# Patient Record
Sex: FEMALE | Race: WHITE | NOT HISPANIC OR LATINO | Employment: FULL TIME | URBAN - METROPOLITAN AREA
[De-identification: names, ages, dates, MRNs, and addresses within clinical notes are randomized per-mention and may not be internally consistent; named-entity substitution may affect disease eponyms.]

---

## 2022-04-14 ENCOUNTER — OFFICE VISIT (OUTPATIENT)
Dept: OBSTETRICS AND GYNECOLOGY | Facility: CLINIC | Age: 27
End: 2022-04-14

## 2022-04-14 VITALS — HEART RATE: 79 BPM | DIASTOLIC BLOOD PRESSURE: 80 MMHG | SYSTOLIC BLOOD PRESSURE: 125 MMHG | WEIGHT: 189 LBS

## 2022-04-14 DIAGNOSIS — Z30.432 ENCOUNTER FOR IUD REMOVAL: ICD-10-CM

## 2022-04-14 DIAGNOSIS — Z01.419 WELL WOMAN EXAM: Primary | ICD-10-CM

## 2022-04-14 PROCEDURE — 99385 PREV VISIT NEW AGE 18-39: CPT | Performed by: NURSE PRACTITIONER

## 2022-04-14 PROCEDURE — 58301 REMOVE INTRAUTERINE DEVICE: CPT | Performed by: NURSE PRACTITIONER

## 2022-04-14 PROCEDURE — 87491 CHLMYD TRACH DNA AMP PROBE: CPT | Performed by: NURSE PRACTITIONER

## 2022-04-14 PROCEDURE — G0123 SCREEN CERV/VAG THIN LAYER: HCPCS | Performed by: NURSE PRACTITIONER

## 2022-04-14 PROCEDURE — 87591 N.GONORRHOEAE DNA AMP PROB: CPT | Performed by: NURSE PRACTITIONER

## 2022-04-14 RX ORDER — NORETHINDRONE ACETATE AND ETHINYL ESTRADIOL AND FERROUS FUMARATE 1MG-20(24)
1 KIT ORAL DAILY
Qty: 28 TABLET | Refills: 12 | Status: SHIPPED | OUTPATIENT
Start: 2022-04-14 | End: 2023-04-14

## 2022-04-14 NOTE — PROGRESS NOTES
HPI:   26 y.o..Presents for well woman exam. Contraception or HRT: Contraception:  Mirena IUD  Menses: No menses with IUD  Pain:  None  Last pap normal 2017  Complaints: Desires IUD removal. . I reviewed the procedure in detail.  She understand the potential risks include, but are not limited to, pain, bleeding, and infection.  Her questions have been answered. Consent has been reviewed and signed.  Patient reports that she is not currently experiencing any symptoms of urinary incontinence.      History reviewed. No pertinent past medical history.   Past Surgical History:   Procedure Laterality Date   •  SECTION        Family History   Problem Relation Age of Onset   • Heart disease Father         PCP: does manage PMHx and preventative labs    PHYSICAL EXAM: Chaperone present /80   Pulse 79   Wt 85.7 kg (189 lb)   LMP  (LMP Unknown)   Breastfeeding No   General- NAD, alert and oriented, appropriate  Psych- Normal mood, good memory  Neck- No masses, no thyroid enlargement  Lymphatic- No palpable neck, axillary, or groin nodes  CV- Regular rhythm, no murmurs  Resp- CTA to bases, no wheezes  Abdomen- Soft, non distended, non tender, no masses  Breast left-  Bilaterally symmetrical, no masses, non tender, no nipple discharge  Breast right- Bilaterally symmetrical, no masses, non tender, no nipple discharge  External genitalia- Normal female, no lesions  Urethra/meatus- Normal, no masses, non tender, no prolapse  Bladder- Normal, no masses, non tender, no prolapse  Vagina- Normal, no atrophy, no lesions, no discharge, no prolapse  Cvx- Normal, no lesions, no discharge, No cervical motion tenderness,  Strings visualized at 1 cm, IUD removed intact.  Discarded. Patient tolerated the procedure well.  Patient tolerated the procedure well  Uterus- Normal size, shape & consistency.  Non tender, mobile, & no prolapse  Adnexa- No mass, non tender  Anus/Rectum/Perineum- Not performed  Ext- No edema, no  cyanosis    Skin- No lesions, no rashes, no acanthosis nigricans       ASSESSMENT and PLAN:    Diagnoses and all orders for this visit:    1. Well woman exam (Primary)  -     IGP, CtNg, Rfx Aptima HPV ASCU    2. Encounter for IUD removal  -     IGP, CtNg, Rfx Aptima HPV ASCU    Other orders  -     norethindrone-ethinyl estradiol-ferrous fumarate (LOESTIN 24 FE) 1-20 MG-MCG(24) per tablet; Take 1 tablet by mouth Daily.  Dispense: 28 tablet; Refill: 12      Preventative:   BREAST HEALTH- Monthly self breast exam importance and how to reviewed. MMG and/or MRI (prn) reviewed per society guidelines and her individual history. Screen: Updated today  SEXUAL HEALTH: STD screening recommended.  Ordered,  Safe sex and condoms  Follow up PCP/Specialist PMHx and Labs  Myriad: Does not qualify.  All BIRTH CONTROL options R/B/A/SE/E of each reviewed in detail.  OCP/hormone use risk THROMBOEMBOLIC RISK reviewed.     SAFE SEX/condoms importance reviewed.    She understand she can become pregnant immediately.  I recommend she keep track of menses and RTO if <q21d, >7d long, heavy or painful.  R/B/A/SE/efficacy of all BC options reviewed with respect to her individual medical history.   She has decided on OCP (estrogen/progesterone) for BC.  If she desires pregnancy I have encouraged a healthy lifestyle and PNV.     PRECAUTIONS - She was advised to watch for fever, chills, vaginal discharge or odor.    She understands the importance of having any ordered tests to be performed in a timely fashion.  The risks of not performing them include, but are not limited to, advanced cancer stages, bone loss from osteoporosis and/or subsequent increase in morbidity and/or mortality.  She is encouraged to review her results online and/or contact or office if she has questions.     Follow Up:  No follow-ups on file.        Dee Wakefield, APRN  04/14/2022

## 2022-04-21 LAB
C TRACH RRNA CVX QL NAA+PROBE: NEGATIVE
CONV .: NORMAL
CYTOLOGIST CVX/VAG CYTO: NORMAL
CYTOLOGY CVX/VAG DOC CYTO: NORMAL
CYTOLOGY CVX/VAG DOC THIN PREP: NORMAL
DX ICD CODE: NORMAL
HIV 1 & 2 AB SER-IMP: NORMAL
N GONORRHOEA RRNA CVX QL NAA+PROBE: NEGATIVE
OTHER STN SPEC: NORMAL
STAT OF ADQ CVX/VAG CYTO-IMP: NORMAL

## 2023-05-11 NOTE — PROGRESS NOTES
"  HPI:   27 y.o. . Presents for well woman exam. Contraception:  Natural family planning  Menses:   q month days, lasts 5 days, changes regular tampon q 2hrs on heaviest days. 6 weeks late on menses, home pregnancy test negative  Desires to conceive  Pain:  None  Last pap: normal   Complaints: Actively attempting conception for over 1 year, Mirena removed -     having pain with ejaculation, seeing a urologist upcoming    10 pound weight gain over past month, eating out more  No acne, has shaved upper lip since teen    IGP, CtNg, Rfx Aptima HPV ASCU (2022 14:14)       History reviewed. No pertinent past medical history.   Past Surgical History:   Procedure Laterality Date   • ANKLE SURGERY Left     x3   •  SECTION     • TONGUE SURGERY      resconstruction   • WISDOM TOOTH EXTRACTION        Family History   Problem Relation Age of Onset   • Heart disease Father    • Breast cancer Neg Hx    • Ovarian cancer Neg Hx    • Uterine cancer Neg Hx    • Prostate cancer Neg Hx    • Colon cancer Neg Hx      Allergies as of 05/15/2023 - Reviewed 05/15/2023   Allergen Reaction Noted   • Sulfa antibiotics Rash 10/12/2021        PCP: does manage PMHx and preventative labs    /84   Pulse 95   Ht 160 cm (63\")   Wt 102 kg (223 lb 12.8 oz)   LMP 2023 (Approximate)   BMI 39.64 kg/m²     PHYSICAL EXAM: Chaperone present   General- NAD, alert and oriented, appropriate  Psych- Normal mood, good memory  Neck- No masses, no thyroid enlargement  Lymphatic- No palpable neck, axillary, or groin nodes  CV- Regular rhythm, no murmurs  Resp- CTA to bases, no wheezes  Abdomen- Soft, non distended, non tender, no masses  Breast left-  Bilaterally symmetrical, no masses, non tender, no nipple discharge  Breast right- Bilaterally symmetrical, no masses, non tender, no nipple discharge  External genitalia- Normal female, no lesions  Urethra/meatus- Normal, no masses, non tender, no prolapse  Bladder- " Normal, no masses, non tender, no prolapse  Vagina- Normal, no atrophy, no lesions, no discharge, no prolapse  Cvx- Normal, no lesions, no discharge, No cervical motion tenderness  Uterus- Normal size, shape & consistency.  Non tender, mobile, & no prolapse  Adnexa- No mass, non tender  Anus/Rectum/Perineum- Not performed  Ext- No edema, no cyanosis    Skin- No lesions, no rashes, no acanthosis nigricans        ASSESSMENT and PLAN:    Diagnoses and all orders for this visit:    1. Well woman exam (Primary)    2. Missed period  -     POC Pregnancy, Urine  -     Prolactin; Future  -     TSH; Future  -     CBC (No Diff); Future  -     DHEA-Sulfate; Future  -     Insulin, Total; Future  -     Glucose, Fasting; Future  -     Testosterone; Future  -     17-Hydroxyprogesterone; Future      HCG, Urine, QL   Date Value Ref Range Status   05/15/2023 Negative Negative Final      Preventative:   BREAST HEALTH- Breast awareness, self breast exam, MMG and/or MRI reviewed per society guidelines and her individual history. Screen: Updated today  CERVICAL CANCER Screening- Reviewed current ASCCP guidelines for screening w and wo cotest HPV, age specific.  Screen: Updated today  COLON CANCER Screening- Reviewed current medical society guidelines and options.  Screen:  Not medically needed  SEXUAL HEALTH: Declines STD screening  BONE HEALTH- Reviewed current medical society guidelines and options for testing, calcium and vit D intake.  Weight bearing exercise.  DEXA: Not medically needed  VACCINATIONS Recommended: COVID and booster PRN, Flu annually.  Importance discussed, risk being unvaccinated reviewed.  Questions answered  Smoking status- NON SMOKER  SMOKING STATUS- pt does use nicotine and/or tobacco.  I reviewed importance of avoiding.  Recommend FU w PCP regarding quitting options if unable to quit wo assistance.  Does not qualify (age 50-80, 20pack yr hx, current or former smoker, quit w/in last 15yrs, no symptoms of lung CA)  for low dose CT of chest.  If qualifies, I recommend pt FU w PCP to schedule/monitor screening for lung CA  Follow up PCP/Specialist PMHx and Labs  Genetic testing: Does not qualify.  PCOS- Dx, Tx, weight, pregnancy, periods/anovulation, cholesterol, insulin, and uterine cancer risk discussed w pt.  Pregnancy risks associated with Obesity: antepartum, intrapartum, postpartum complications to include: increased risk   birth, impaired growth, spontaneous , stillbirth, Neural tube defects, congenital anomalies, maternal-cardiac dysfunction, gestational diabetes, preeclampsia. Postpartum: wound dehiscence, venous thrombosis, maternal and fetal morbidity.    Healthy diet and PNV daily if desire pregnancy   having workup d/t issues with pain with ejaculation, previous pelvic injury, if desires fertility counseling, follow up with MD discuss treatment     She understands the importance of having any ordered tests to be performed in a timely fashion.  The risks of not performing them include, but are not limited to, advanced cancer stages, bone loss from osteoporosis and/or subsequent increase in morbidity and/or mortality.  She is encouraged to review her results online and/or contact or office if she has questions.     Follow Up:  Return for will call with lab results.        Dee Wakefield, APRN  05/15/2023

## 2023-05-15 ENCOUNTER — OFFICE VISIT (OUTPATIENT)
Dept: OBSTETRICS AND GYNECOLOGY | Facility: CLINIC | Age: 28
End: 2023-05-15
Payer: COMMERCIAL

## 2023-05-15 VITALS
HEIGHT: 63 IN | HEART RATE: 95 BPM | BODY MASS INDEX: 39.65 KG/M2 | DIASTOLIC BLOOD PRESSURE: 84 MMHG | WEIGHT: 223.8 LBS | SYSTOLIC BLOOD PRESSURE: 137 MMHG

## 2023-05-15 DIAGNOSIS — Z01.419 WELL WOMAN EXAM: Primary | ICD-10-CM

## 2023-05-15 DIAGNOSIS — N92.6 MISSED PERIOD: ICD-10-CM

## 2023-05-15 LAB
B-HCG UR QL: NEGATIVE
EXPIRATION DATE: NORMAL
INTERNAL NEGATIVE CONTROL: NORMAL
INTERNAL POSITIVE CONTROL: NORMAL
Lab: NORMAL

## 2023-05-18 ENCOUNTER — LAB (OUTPATIENT)
Dept: OBSTETRICS AND GYNECOLOGY | Facility: CLINIC | Age: 28
End: 2023-05-18
Payer: COMMERCIAL

## 2023-05-18 DIAGNOSIS — N92.6 MISSED PERIOD: ICD-10-CM

## 2023-05-18 LAB
DEPRECATED RDW RBC AUTO: 40.2 FL (ref 37–54)
ERYTHROCYTE [DISTWIDTH] IN BLOOD BY AUTOMATED COUNT: 13.2 % (ref 12.3–15.4)
GLUCOSE P FAST SERPL-MCNC: 103 MG/DL (ref 74–106)
HCT VFR BLD AUTO: 40.5 % (ref 34–46.6)
HGB BLD-MCNC: 14.1 G/DL (ref 12–15.9)
MCH RBC QN AUTO: 29.1 PG (ref 26.6–33)
MCHC RBC AUTO-ENTMCNC: 34.8 G/DL (ref 31.5–35.7)
MCV RBC AUTO: 83.7 FL (ref 79–97)
PLATELET # BLD AUTO: 267 10*3/MM3 (ref 140–450)
PMV BLD AUTO: 9.7 FL (ref 6–12)
PROLACTIN SERPL-MCNC: 20.9 NG/ML (ref 4.79–23.3)
RBC # BLD AUTO: 4.84 10*6/MM3 (ref 3.77–5.28)
TESTOST SERPL-MCNC: 43.3 NG/DL (ref 8.4–48.1)
TSH SERPL DL<=0.05 MIU/L-ACNC: 1.99 UIU/ML (ref 0.27–4.2)
WBC NRBC COR # BLD: 6.16 10*3/MM3 (ref 3.4–10.8)

## 2023-05-18 PROCEDURE — 84403 ASSAY OF TOTAL TESTOSTERONE: CPT | Performed by: NURSE PRACTITIONER

## 2023-05-18 PROCEDURE — 85027 COMPLETE CBC AUTOMATED: CPT | Performed by: NURSE PRACTITIONER

## 2023-05-18 PROCEDURE — 83525 ASSAY OF INSULIN: CPT | Performed by: NURSE PRACTITIONER

## 2023-05-18 PROCEDURE — 84146 ASSAY OF PROLACTIN: CPT | Performed by: NURSE PRACTITIONER

## 2023-05-18 PROCEDURE — 84443 ASSAY THYROID STIM HORMONE: CPT | Performed by: NURSE PRACTITIONER

## 2023-05-18 PROCEDURE — 82947 ASSAY GLUCOSE BLOOD QUANT: CPT | Performed by: NURSE PRACTITIONER

## 2023-05-18 PROCEDURE — 83498 ASY HYDROXYPROGESTERONE 17-D: CPT | Performed by: NURSE PRACTITIONER

## 2023-05-18 PROCEDURE — 82627 DEHYDROEPIANDROSTERONE: CPT | Performed by: NURSE PRACTITIONER

## 2023-05-20 LAB
DHEA-S SERPL-MCNC: 195 UG/DL (ref 84.8–378)
INSULIN SERPL-ACNC: 44 UIU/ML (ref 2.6–24.9)

## 2023-05-22 ENCOUNTER — TELEPHONE (OUTPATIENT)
Dept: OBSTETRICS AND GYNECOLOGY | Facility: CLINIC | Age: 28
End: 2023-05-22
Payer: COMMERCIAL

## 2023-05-22 DIAGNOSIS — E16.1 HYPERINSULINEMIA: Primary | ICD-10-CM

## 2023-05-22 NOTE — TELEPHONE ENCOUNTER
----- Message from JOSE Patel sent at 5/22/2023  8:14 AM EDT -----  Inform [place] her fasting blood sugar was at the high end of the normal range, additionally her insulin level was slightly elevated. I am sending metformin to her pharmacy to help improve insulin sensitivity, additionally recommend working on increasing exercise and limit sugar/carb intake in her diet, weightloss will also help with regulating menstruation and ovulation and lower risk of pregnancy complications as she desires conception. Common side effect of GI upset/diarrhea first 2 weeks of use, will start with low dose to minimize this side effect. Follow up in 3 months.

## 2023-05-22 NOTE — TELEPHONE ENCOUNTER
Discussed results and recommendations with pt, advised a prescription for metformin was sent to her pharmacy. Went over side effects for the first 2 weeks. Pt stated she did have a chocolate pop tart and a half of a mountain dew at about abut 12:30 PM the night before . She wanted to know if this could have affected the test.

## 2023-05-22 NOTE — TELEPHONE ENCOUNTER
Discussed with pt that it would not have had anything to do with her results that she ate at 1230.

## 2023-05-24 LAB — 17OHP SERPL-MCNC: 70 NG/DL

## 2023-08-15 DIAGNOSIS — M25.472 LEFT ANKLE SWELLING: ICD-10-CM

## 2023-08-15 RX ORDER — FUROSEMIDE 20 MG/1
TABLET ORAL
Qty: 30 TABLET | Refills: 1 | Status: SHIPPED | OUTPATIENT
Start: 2023-08-15

## 2023-08-21 NOTE — PROGRESS NOTES
"GYN Problem/Follow Up Visit    Chief Complaint   Patient presents with    Follow-up     Meds            HPI  Mali Mercer is a 28 y.o. female, , who presents for follow up after starting metformin for management insulin resistance, elevated fasting insulin levels  44.0.  Reports tolerated metformin 500mg once daily, when taking twice daily experiences loose stool. Was started on Phentermine by PCP to aid in weightloss. Has lost 15 pounds. Has fu with PCP tomorrow.     Desires pregnancy.  had circumcision recently to relieve obstruction.       Additional OB/GYN History   Patient's last menstrual period was 2023 (exact date).  Current contraception: contraceptive methods: None    History reviewed. No pertinent past medical history.   Past Surgical History:   Procedure Laterality Date    ANKLE SURGERY Left     x3     SECTION      FRACTURE SURGERY  2011    TONGUE SURGERY      resconstruction    WISDOM TOOTH EXTRACTION        Family History   Problem Relation Age of Onset    Heart disease Father     Anxiety disorder Father     Breast cancer Neg Hx     Ovarian cancer Neg Hx     Uterine cancer Neg Hx     Prostate cancer Neg Hx     Colon cancer Neg Hx      Allergies as of 2023 - Reviewed 2023   Allergen Reaction Noted    Sulfa antibiotics Rash 10/12/2021      The additional following portions of the patient's history were reviewed and updated as appropriate: allergies, current medications, past family history, past medical history, past social history, past surgical history, and problem list.    Review of Systems    See HPI for pertinent ROS    Objective   /86   Pulse 98   Ht 160 cm (63\")   Wt 94.3 kg (208 lb)   LMP 2023 (Exact Date)   BMI 36.85 kg/mý     Physical Exam  Vitals and nursing note reviewed.   Constitutional:       Appearance: Normal appearance. She is well-developed and well-groomed.   Cardiovascular:      Rate and Rhythm: Normal rate. "   Pulmonary:      Effort: Pulmonary effort is normal.   Skin:     General: Skin is warm and dry.   Neurological:      Mental Status: She is alert and oriented to person, place, and time.   Psychiatric:         Mood and Affect: Affect normal.         Cognition and Memory: Cognition normal.          Assessment and Plan    Diagnoses and all orders for this visit:    1. Hyperinsulinemia (Primary)  -     metFORMIN (GLUCOPHAGE) 500 MG tablet; Take 1 tablet by mouth 2 (Two) Times a Day With Meals. Start with once daily after dinner for the first week, increase to twice daily if tolerates  Dispense: 180 tablet; Refill: 3      Counseling:  TRACK MENSES, RTO if <q21d, >7d long, heavy or painful.    Recommend PNV daily and healthy lifestyle if desires pregnancy, recommend contraceptive/barrier method while taking phentermine d/t risk of fetal harm. PCP to collect lab work at tomorrows office visit. Importance of starting pregnancy at healthy baseline weight. Refill metformin, recommend continuity of care/surveillance of insulin resistance with PCP.      Follow Up:  Return if symptoms worsen or fail to improve.        Dee Wakefield, APRN  08/22/2023

## 2023-08-22 ENCOUNTER — OFFICE VISIT (OUTPATIENT)
Dept: OBSTETRICS AND GYNECOLOGY | Facility: CLINIC | Age: 28
End: 2023-08-22
Payer: COMMERCIAL

## 2023-08-22 VITALS
HEIGHT: 63 IN | WEIGHT: 208 LBS | BODY MASS INDEX: 36.86 KG/M2 | DIASTOLIC BLOOD PRESSURE: 86 MMHG | HEART RATE: 98 BPM | SYSTOLIC BLOOD PRESSURE: 138 MMHG

## 2023-08-22 DIAGNOSIS — E16.1 HYPERINSULINEMIA: Primary | ICD-10-CM

## 2023-08-23 ENCOUNTER — OFFICE VISIT (OUTPATIENT)
Dept: FAMILY MEDICINE CLINIC | Facility: CLINIC | Age: 28
End: 2023-08-23
Payer: COMMERCIAL

## 2023-08-23 ENCOUNTER — HOSPITAL ENCOUNTER (OUTPATIENT)
Dept: GENERAL RADIOLOGY | Facility: HOSPITAL | Age: 28
Discharge: HOME | End: 2023-08-23
Admitting: STUDENT IN AN ORGANIZED HEALTH CARE EDUCATION/TRAINING PROGRAM
Payer: COMMERCIAL

## 2023-08-23 VITALS
HEART RATE: 96 BPM | BODY MASS INDEX: 36.86 KG/M2 | HEIGHT: 63 IN | WEIGHT: 208 LBS | RESPIRATION RATE: 16 BRPM | DIASTOLIC BLOOD PRESSURE: 72 MMHG | OXYGEN SATURATION: 96 % | TEMPERATURE: 97.5 F | SYSTOLIC BLOOD PRESSURE: 116 MMHG

## 2023-08-23 DIAGNOSIS — E66.9 OBESITY (BMI 30-39.9): ICD-10-CM

## 2023-08-23 DIAGNOSIS — Z00.00 ANNUAL PHYSICAL EXAM: Primary | ICD-10-CM

## 2023-08-23 DIAGNOSIS — M79.10 MUSCLE PAIN: ICD-10-CM

## 2023-08-23 DIAGNOSIS — R10.9 FLANK PAIN: ICD-10-CM

## 2023-08-23 PROCEDURE — 74018 RADEX ABDOMEN 1 VIEW: CPT

## 2023-08-23 PROCEDURE — 99395 PREV VISIT EST AGE 18-39: CPT | Performed by: STUDENT IN AN ORGANIZED HEALTH CARE EDUCATION/TRAINING PROGRAM

## 2023-08-23 RX ORDER — PHENTERMINE HYDROCHLORIDE 37.5 MG/1
37.5 CAPSULE ORAL EVERY MORNING
Qty: 60 CAPSULE | Refills: 0 | Status: SHIPPED | OUTPATIENT
Start: 2023-08-23

## 2023-08-23 RX ORDER — CYCLOBENZAPRINE HCL 10 MG
10 TABLET ORAL 2 TIMES DAILY PRN
Qty: 30 TABLET | Refills: 1 | Status: SHIPPED | OUTPATIENT
Start: 2023-08-23

## 2023-08-23 RX ORDER — PREDNISONE 20 MG/1
20 TABLET ORAL DAILY
Qty: 5 TABLET | Refills: 0 | Status: SHIPPED | OUTPATIENT
Start: 2023-08-23

## 2023-08-23 NOTE — PROGRESS NOTES
"Chief Complaint  Patient is here for annual physical and follow-up on obesity/medications/right flank pain        Subjective         Mali Mercer is a 28 y.o. female who presents to [facility]    28 years old comes to the clinic today for annual physical and follow-up.    Patient was started on phentermine, lost 12 pounds in the last 2 months.  Reports no side effects of the medication, patient would like to continue for 2 more months    Patient is complaining of right mid back pain which started during her travels out of state about few weeks ago, nonradiating, mild tenderness without any urinary symptoms/abdominal symptoms/nausea/vomiting/bowel movement changes or any urinary changes.  Improves after resting, gets worse with certain position    12+ review of systems are unremarkable otherwise    Objective   Vital Signs:   Vitals:    08/23/23 0811   BP: 116/72   BP Location: Left arm   Patient Position: Sitting   Cuff Size: Adult   Pulse: 96   Resp: 16   Temp: 97.5 øF (36.4 øC)   TempSrc: Temporal   SpO2: 96%   Weight: 94.3 kg (208 lb)   Height: 160 cm (63\")      Body mass index is 36.85 kg/mý.   Wt Readings from Last 3 Encounters:   08/23/23 94.3 kg (208 lb)   08/22/23 94.3 kg (208 lb)   07/17/23 97.3 kg (214 lb 9.6 oz)      BP Readings from Last 3 Encounters:   08/23/23 116/72   08/22/23 138/86   07/17/23 128/70        Patient Care Team:  Kellen Casanova MD as PCP - General (Family Medicine)     Physical Exam  Vitals reviewed.   Constitutional:       Appearance: Normal appearance. She is well-developed.   HENT:      Head: Normocephalic and atraumatic.      Right Ear: External ear normal.      Left Ear: External ear normal.      Mouth/Throat:      Pharynx: No oropharyngeal exudate.   Eyes:      Conjunctiva/sclera: Conjunctivae normal.      Pupils: Pupils are equal, round, and reactive to light.   Cardiovascular:      Rate and Rhythm: Normal rate and regular rhythm.      " Heart sounds: No murmur heard.    No friction rub. No gallop.   Pulmonary:      Effort: Pulmonary effort is normal.      Breath sounds: Normal breath sounds. No wheezing or rhonchi.   Abdominal:      General: Bowel sounds are normal. There is no distension.      Palpations: Abdomen is soft.      Tenderness: There is no abdominal tenderness. There is right CVA tenderness. There is no left CVA tenderness, guarding or rebound. Negative signs include Chauhan's sign, Rovsing's sign, McBurney's sign and psoas sign.      Comments: Some tenderness to right mid back noted   Skin:     General: Skin is warm and dry.   Neurological:      Mental Status: She is alert and oriented to person, place, and time.      Cranial Nerves: No cranial nerve deficit.   Psychiatric:         Mood and Affect: Mood and affect normal.         Behavior: Behavior normal.         Thought Content: Thought content normal.         Judgment: Judgment normal.                          Assessment and Plan   Diagnoses and all orders for this visit:    1. Annual physical exam, next visit (Primary)  Comments:  Daily exercise and healthy diet recommended  Orders:  -     TSH Rfx On Abnormal To Free T4; Future  -     CBC & Differential; Future  -     Comprehensive Metabolic Panel; Future  -     Hemoglobin A1c; Future  -     Lipid Panel; Future    2. Obesity (BMI 30-39.9)  Comments:  2 more months phentermine  Orders:  -     phentermine 37.5 MG capsule; Take 1 capsule by mouth Every Morning.  Dispense: 60 capsule; Refill: 0  -     TSH Rfx On Abnormal To Free T4; Future  -     CBC & Differential; Future  -     Comprehensive Metabolic Panel; Future  -     Hemoglobin A1c; Future  -     Lipid Panel; Future    3. Flank pain  Comments:  KUB, prednisone/NSAIDs and muscle relaxant recommended.  If not improved in next 2 weeks; patient to reach out back for further evaluation including CT/referral  Orders:  -     XR Abdomen KUB; Future  -     predniSONE (DELTASONE) 20 MG  tablet; Take 1 tablet by mouth Daily.  Dispense: 5 tablet; Refill: 0  -     cyclobenzaprine (FLEXERIL) 10 MG tablet; Take 1 tablet by mouth 2 (Two) Times a Day As Needed for Muscle Spasms.  Dispense: 30 tablet; Refill: 1  -     TSH Rfx On Abnormal To Free T4; Future  -     CBC & Differential; Future  -     Comprehensive Metabolic Panel; Future  -     Hemoglobin A1c; Future  -     Lipid Panel; Future    4. Muscle pain  -     XR Abdomen KUB; Future  -     predniSONE (DELTASONE) 20 MG tablet; Take 1 tablet by mouth Daily.  Dispense: 5 tablet; Refill: 0  -     cyclobenzaprine (FLEXERIL) 10 MG tablet; Take 1 tablet by mouth 2 (Two) Times a Day As Needed for Muscle Spasms.  Dispense: 30 tablet; Refill: 1  -     TSH Rfx On Abnormal To Free T4; Future  -     CBC & Differential; Future  -     Comprehensive Metabolic Panel; Future  -     Hemoglobin A1c; Future  -     Lipid Panel; Future          Tobacco Use: High Risk    Smoking Tobacco Use: Every Day    Smokeless Tobacco Use: Never    Passive Exposure: Yes            Follow Up   Return in about 6 months (around 2/23/2024) for Next scheduled follow up.  Patient was given instructions and counseling regarding her condition or for health maintenance advice. Please see specific information pulled into the AVS if appropriate.

## 2023-09-13 ENCOUNTER — HOSPITAL ENCOUNTER (OUTPATIENT)
Dept: CT IMAGING | Facility: HOSPITAL | Age: 28
Discharge: HOME | End: 2023-09-13
Payer: COMMERCIAL

## 2023-09-13 ENCOUNTER — OFFICE VISIT (OUTPATIENT)
Dept: FAMILY MEDICINE CLINIC | Facility: CLINIC | Age: 28
End: 2023-09-13
Payer: COMMERCIAL

## 2023-09-13 VITALS
BODY MASS INDEX: 36.06 KG/M2 | WEIGHT: 203.5 LBS | HEIGHT: 63 IN | DIASTOLIC BLOOD PRESSURE: 68 MMHG | OXYGEN SATURATION: 96 % | TEMPERATURE: 98 F | SYSTOLIC BLOOD PRESSURE: 132 MMHG

## 2023-09-13 DIAGNOSIS — R10.11 RIGHT UPPER QUADRANT ABDOMINAL PAIN: ICD-10-CM

## 2023-09-13 DIAGNOSIS — K80.20 CALCULUS OF GALLBLADDER WITHOUT CHOLECYSTITIS WITHOUT OBSTRUCTION: Primary | ICD-10-CM

## 2023-09-13 DIAGNOSIS — K21.9 GASTROESOPHAGEAL REFLUX DISEASE WITHOUT ESOPHAGITIS: ICD-10-CM

## 2023-09-13 DIAGNOSIS — R10.9 RIGHT FLANK PAIN: ICD-10-CM

## 2023-09-13 DIAGNOSIS — R10.11 RIGHT UPPER QUADRANT ABDOMINAL PAIN: Primary | ICD-10-CM

## 2023-09-13 DIAGNOSIS — R11.0 NAUSEA: ICD-10-CM

## 2023-09-13 DIAGNOSIS — K42.9 UMBILICAL HERNIA WITHOUT OBSTRUCTION AND WITHOUT GANGRENE: ICD-10-CM

## 2023-09-13 LAB
CREAT BLDA-MCNC: 1 MG/DL
EGFRCR SERPLBLD CKD-EPI 2021: 78.4 ML/MIN/1.73

## 2023-09-13 PROCEDURE — 82565 ASSAY OF CREATININE: CPT

## 2023-09-13 PROCEDURE — 74178 CT ABD&PLV WO CNTR FLWD CNTR: CPT

## 2023-09-13 PROCEDURE — 25510000001 IOPAMIDOL PER 1 ML

## 2023-09-13 PROCEDURE — 99213 OFFICE O/P EST LOW 20 MIN: CPT

## 2023-09-13 RX ORDER — OMEPRAZOLE 40 MG/1
40 CAPSULE, DELAYED RELEASE ORAL DAILY
Qty: 30 CAPSULE | Refills: 0 | Status: SHIPPED | OUTPATIENT
Start: 2023-09-13

## 2023-09-13 RX ORDER — OMEPRAZOLE 40 MG/1
40 CAPSULE, DELAYED RELEASE ORAL DAILY
Qty: 90 CAPSULE | OUTPATIENT
Start: 2023-09-13

## 2023-09-13 RX ADMIN — IOPAMIDOL 100 ML: 755 INJECTION, SOLUTION INTRAVENOUS at 16:31

## 2023-09-13 NOTE — PROGRESS NOTES
"Mali Mercer presents to [facility] with complaints of right flank and abdominal pain      History of Present Illness  28-year-old female presents to office today for an acute visit. She has continued complaints of right flank pain with pain now radiating to right abdomen. She states the muscle relaxers and steroids previously prescribed only improved symptoms minimally but it did not resolve her symptoms.   She is unable to identify if her pain worsens after eating but she does state that her pain is good in the morning but progresses throughout the day and is worst at night. She states she has persistent nausea and is only eating one meal per day. She has been taking Phentermine without any reported intolerances. She denies any increased belching or acid reflux. She does report however that her dentist did instruct her to speak with her PCP as she has erosion to her teeth from acid reflux. She is not taking any medications to assist with acid reflux.   Her KUB previously obtained did show gallstones. Patient states that her grand-mother wanted her to mention that she had surgery around this age for her gallbladder.     The following portions of the patient's history were personally reviewed and updated as appropriate: allergies, current medications, past medical history, past surgical history, past family history, and past social history.       Objective   Vital Signs:   /68 (BP Location: Left arm, Patient Position: Sitting, Cuff Size: Adult)   Temp 98 °F (36.7 °C) (Temporal)   Ht 160 cm (63\")   Wt 92.3 kg (203 lb 8 oz)   SpO2 96%   BMI 36.05 kg/m²     Body mass index is 36.05 kg/m².    All labs, imaging, test results, and specialty provider notes reviewed with patient.     Physical Exam  Vitals and nursing note reviewed.   Constitutional:       Appearance: Normal appearance.   Cardiovascular:      Rate and Rhythm: Normal rate and regular rhythm.      Heart " sounds: Normal heart sounds.   Pulmonary:      Effort: Pulmonary effort is normal.      Breath sounds: Normal breath sounds.   Abdominal:      Palpations: Abdomen is soft.      Tenderness: There is generalized abdominal tenderness and tenderness in the right upper quadrant.      Comments: Right flank tenderness   Neurological:      Mental Status: She is alert and oriented to person, place, and time.   Psychiatric:         Attention and Perception: Attention normal.         Mood and Affect: Mood normal.         Behavior: Behavior is cooperative.                             Assessment and Plan:  Diagnoses and all orders for this visit:    1. Right upper quadrant abdominal pain (Primary)  -     CT Abdomen Pelvis With & Without Contrast; Future    2. Right flank pain  -     CT Abdomen Pelvis With & Without Contrast; Future    3. Nausea  -     CT Abdomen Pelvis With & Without Contrast; Future    4. Gastroesophageal reflux disease without esophagitis  -     CT Abdomen Pelvis With & Without Contrast; Future  -     omeprazole (priLOSEC) 40 MG capsule; Take 1 capsule by mouth Daily.  Dispense: 30 capsule; Refill: 0      Patient educated on possible causes of abdominal / flank pain to be considered including gall bladder and kidneys. Patient to obtain stat CT of abdomen for further review. Discontinue Phentermine until cause of discomfort can be determined. Begin taking Omeprazole to assist with acid reflux management.     Follow Up:  No follow-ups on file.    Patient was given instructions and counseling regarding her condition or for health maintenance advice. Please see specific information pulled into the AVS if appropriate.

## 2023-09-18 ENCOUNTER — OFFICE VISIT (OUTPATIENT)
Dept: SURGERY | Facility: CLINIC | Age: 28
End: 2023-09-18
Payer: COMMERCIAL

## 2023-09-18 VITALS — RESPIRATION RATE: 18 BRPM | HEIGHT: 63 IN | BODY MASS INDEX: 35.61 KG/M2 | WEIGHT: 201 LBS

## 2023-09-18 DIAGNOSIS — K80.20 CALCULUS OF GALLBLADDER WITHOUT CHOLECYSTITIS WITHOUT OBSTRUCTION: Primary | ICD-10-CM

## 2023-09-18 PROCEDURE — 99203 OFFICE O/P NEW LOW 30 MIN: CPT | Performed by: SURGERY

## 2023-09-18 RX ORDER — ONDANSETRON 2 MG/ML
4 INJECTION INTRAMUSCULAR; INTRAVENOUS EVERY 6 HOURS PRN
OUTPATIENT
Start: 2023-09-18

## 2023-09-18 RX ORDER — INDOCYANINE GREEN AND WATER 25 MG
1.25 KIT INJECTION ONCE
OUTPATIENT
Start: 2023-09-18 | End: 2023-09-18

## 2023-09-18 RX ORDER — SODIUM CHLORIDE, SODIUM LACTATE, POTASSIUM CHLORIDE, CALCIUM CHLORIDE 600; 310; 30; 20 MG/100ML; MG/100ML; MG/100ML; MG/100ML
70 INJECTION, SOLUTION INTRAVENOUS CONTINUOUS
OUTPATIENT
Start: 2023-09-18

## 2023-09-18 RX ORDER — SODIUM CHLORIDE 9 MG/ML
40 INJECTION, SOLUTION INTRAVENOUS AS NEEDED
OUTPATIENT
Start: 2023-09-18

## 2023-09-18 RX ORDER — SODIUM CHLORIDE 0.9 % (FLUSH) 0.9 %
10 SYRINGE (ML) INJECTION AS NEEDED
OUTPATIENT
Start: 2023-09-18

## 2023-09-18 RX ORDER — SODIUM CHLORIDE 0.9 % (FLUSH) 0.9 %
10 SYRINGE (ML) INJECTION EVERY 12 HOURS SCHEDULED
OUTPATIENT
Start: 2023-09-18

## 2023-09-18 NOTE — PROGRESS NOTES
Inpatient History and Physical Surgical Orders    Preadmission Location:   Preadmission Time:  Facility:  Surgery Date:  Surgery Time:  Preadmission Test date:     Chief Complaint  Outpatient History and Physical / Surgical Orders    Primary Care Provider: Kellen Casanova MD    Referring Provider: Kirstin Steen APRN Subjective      Patient Name: Mali Mercer : 1995    HPI  The patient is a 28-year-old female who presents with symptomatic gallstones.  She has recently begun to have episodic right flank pain radiating around to the anterior abdomen.  She had a CT scan that showed gallstones and perhaps a very small umbilical hernia.    Past History:  Medical History: has a past medical history of Umbilical hernia and Urinary tract infection.   Surgical History: has a past surgical history that includes  section; Ankle surgery (Left); Tongue surgery; Webster tooth extraction; and Fracture surgery (2011).   Family History: family history includes Anxiety disorder in her father; Diabetes in her maternal grandfather and maternal grandmother; Heart disease in her father; Other in her maternal grandmother.   Social History: reports that she has been smoking cigarettes. She has a 2.50 pack-year smoking history. She has been exposed to tobacco smoke. She has never used smokeless tobacco. She reports that she does not currently use alcohol. She reports that she does not use drugs.  Allergies: Sulfa antibiotics       Current Outpatient Medications:     cyclobenzaprine (FLEXERIL) 10 MG tablet, Take 1 tablet by mouth 2 (Two) Times a Day As Needed for Muscle Spasms., Disp: 30 tablet, Rfl: 1    furosemide (LASIX) 20 MG tablet, TAKE 1 TABLET BY MOUTH DAILY AS NEEDED FOR SWELLING, Disp: 30 tablet, Rfl: 1    metFORMIN (GLUCOPHAGE) 500 MG tablet, Take 1 tablet by mouth 2 (Two) Times a Day With Meals. Start with once daily after dinner for the first week, increase to twice daily if tolerates, Disp:  "180 tablet, Rfl: 3    omeprazole (priLOSEC) 40 MG capsule, Take 1 capsule by mouth Daily., Disp: 30 capsule, Rfl: 0    phentermine 37.5 MG capsule, Take 1 capsule by mouth Every Morning., Disp: 60 capsule, Rfl: 0       Objective   Vital Signs:   Resp 18   Ht 160 cm (62.99\")   Wt 91.2 kg (201 lb)   BMI 35.61 kg/m²       Physical Exam  Vitals and nursing note reviewed.   Constitutional:       Appearance: Normal appearance. The patient is well-developed.   Cardiovascular:      Rate and Rhythm: Normal rate and regular rhythm.   Pulmonary:      Effort: Pulmonary effort is normal.      Breath sounds: Normal air entry.   Abdominal:      General: Bowel sounds are normal.      Palpations: Abdomen is soft.      Skin:     General: Skin is warm and dry.   Neurological:      Mental Status: The patient is alert and oriented to person, place, and time.      Motor: Motor function is intact.   Psychiatric:         Mood and Affect: Mood normal.       Result Review :               Assessment and Plan   Diagnoses and all orders for this visit:    1. Calculus of gallbladder without cholecystitis without obstruction (Primary)  -     Case Request; Standing  -     Follow Anesthesia Guidelines / Protocol; Standing  -     Verify NPO Status; Standing  -     Obtain Informed Consent; Standing  -     Verify / Perform Chlorhexidine Skin Prep; Standing  -     Verify / Perform Chlorhexidine Skin Prep if Indicated (If Not Already Completed); Standing  -     Insert Peripheral IV; Standing  -     Saline Lock & Maintain IV Access; Standing  -     sodium chloride 0.9 % flush 10 mL  -     sodium chloride 0.9 % flush 10 mL  -     sodium chloride 0.9 % infusion 40 mL  -     lactated ringers infusion  -     ondansetron (ZOFRAN) injection 4 mg  -     indocyanine green (IC-GREEN) injection 1.25 mg  -     Case Request    I reviewed her CT scan today and her umbilical hernia is not very impressive at all and she is not having any periumbilical pain.  She is " having classic gallbladder pain so we will set her up for a robotic cholecystectomy.  I have described the procedure to her as well as the risk and benefits and she is agreeable to proceeding.    I  Manuel Shields MD  09/18/2023

## 2023-10-10 ENCOUNTER — TELEPHONE (OUTPATIENT)
Dept: FAMILY MEDICINE CLINIC | Facility: CLINIC | Age: 28
End: 2023-10-10

## 2023-10-10 NOTE — TELEPHONE ENCOUNTER
Caller: Mali Mercer    Relationship: Self    Best call back number: [phone]    What medications are you currently taking:   Current Outpatient Medications on File Prior to Visit   Medication Sig Dispense Refill    furosemide (LASIX) 20 MG tablet TAKE 1 TABLET BY MOUTH DAILY AS NEEDED FOR SWELLING 30 tablet 1    metFORMIN (GLUCOPHAGE) 500 MG tablet Take 1 tablet by mouth 2 (Two) Times a Day With Meals. Start with once daily after dinner for the first week, increase to twice daily if tolerates 180 tablet 3    phentermine 37.5 MG capsule Take 1 capsule by mouth Every Morning. (Patient taking differently: Take 1 capsule by mouth Every Morning. Last dose 09/19/23) 60 capsule 0     No current facility-administered medications on file prior to visit.        What are your concerns: PATIENT STATED THAT SHE JUST FOUND OUT SHE IS PREGNANT  AND WOULD LIKE TO KNOW IF SHE CAN CONTINUE TAKING HER furosemide (LASIX) 20 MG tablet ,metFORMIN (GLUCOPHAGE) 500 MG tablet ,phentermine 37.5 MG capsule  PLEASE CALL AND ADVISE.

## 2023-10-13 ENCOUNTER — TELEPHONE (OUTPATIENT)
Dept: OBSTETRICS AND GYNECOLOGY | Facility: CLINIC | Age: 28
End: 2023-10-13

## 2023-10-13 NOTE — TELEPHONE ENCOUNTER
Pt was given information for the continuity of care form.  She will submit this and they should have an answer for her within 48 hours.  She will call back at that time.  Pt did inquire about paying for first couple of visits as self pay since employer may be switching insurance companies as of 1/1/24.  Ok per CR.

## 2023-10-13 NOTE — TELEPHONE ENCOUNTER
Caller: Mali Mercer    Relationship: Self    Best call back number: [phone] CALL ANYTIME, IT IS OKAY TO LVM.    What does billing need from the patient: PATIENT HAS TWO [employer] COMMERCIAL POLICIES BUT WOULD LIKE TO STAY ESTABLISHED AND SCHEDULE FOR A NEW OB APPT. LMP 09/05/23, POSITIVE HOME TEST.    PATIENT CALLED [employer] AND CONFIRMED BOTH POLICIES ARE OUT OF NETWORK. PATIENT WOULD LIKE TO FILE FOR A CONTINUATION OF CARE.  A REASON FOR REQUEST NEEDS TO BE LISTED. PATIENT WOULD LIKE TO SPEAK WITH SOMEONE IN OFFICE TO GET HELP FILLING OUT FORM.

## 2023-10-13 NOTE — TELEPHONE ENCOUNTER
FITOM - Pt to call Noemí @ [phone] Option 3 to help with filling out form for continuity of care w/[employer].

## 2023-10-24 ENCOUNTER — TELEPHONE (OUTPATIENT)
Dept: OBSTETRICS AND GYNECOLOGY | Facility: CLINIC | Age: 28
End: 2023-10-24
Payer: COMMERCIAL

## 2023-10-24 NOTE — TELEPHONE ENCOUNTER
Provider: DR RANGEL    Caller: AYAN GUZMAN     Phone Number: [phone]    Reason for Call: PATIENT JUST WANTED TO LET PROVIDER KNOW THAT SHE WAS TAKING LASIX, PHENTERMINE, AND METFORMIN MORE THAN LIKELY WHEN SHE CONCEIVED AND WAS TOLD THAT IT COULD CAUSE PROBLEMS//PATIENT STOPPED THE MEDICATION IN SEPTEMBER BUT WANTED TO MAKE PROVIDER AWARE//PLEASE FOLLOW UP

## 2023-10-24 NOTE — TELEPHONE ENCOUNTER
Patient called talked to someone @ [facility].  I have attached her note.  Last seen 8/22/23.  Next appointment 12/12/23.  Patient is 8 weeks pregnant.

## 2023-10-25 NOTE — TELEPHONE ENCOUNTER
Called patient informed her of Dr Jennings recommendation & rescheduled her appointemnt to 11/7/23 @ 10:20

## 2023-11-07 ENCOUNTER — TELEPHONE (OUTPATIENT)
Dept: OBSTETRICS AND GYNECOLOGY | Facility: CLINIC | Age: 28
End: 2023-11-07
Payer: COMMERCIAL

## 2023-11-07 ENCOUNTER — INITIAL PRENATAL (OUTPATIENT)
Dept: OBSTETRICS AND GYNECOLOGY | Facility: CLINIC | Age: 28
End: 2023-11-07
Payer: COMMERCIAL

## 2023-11-07 VITALS — BODY MASS INDEX: 36.5 KG/M2 | DIASTOLIC BLOOD PRESSURE: 79 MMHG | WEIGHT: 206 LBS | SYSTOLIC BLOOD PRESSURE: 122 MMHG

## 2023-11-07 DIAGNOSIS — O34.219 PREVIOUS CESAREAN DELIVERY AFFECTING PREGNANCY: ICD-10-CM

## 2023-11-07 DIAGNOSIS — K80.20 CALCULUS OF GALLBLADDER WITHOUT CHOLECYSTITIS WITHOUT OBSTRUCTION: ICD-10-CM

## 2023-11-07 DIAGNOSIS — O99.330 TOBACCO USE DURING PREGNANCY, ANTEPARTUM: ICD-10-CM

## 2023-11-07 DIAGNOSIS — Z34.80 SUPERVISION OF OTHER NORMAL PREGNANCY, ANTEPARTUM: Primary | ICD-10-CM

## 2023-11-07 LAB
ABO GROUP BLD: NORMAL
B-HCG UR QL: POSITIVE
BASOPHILS # BLD AUTO: 0.02 10*3/MM3 (ref 0–0.2)
BASOPHILS NFR BLD AUTO: 0.3 % (ref 0–1.5)
BLD GP AB SCN SERPL QL: NEGATIVE
DEPRECATED RDW RBC AUTO: 41.4 FL (ref 37–54)
EOSINOPHIL # BLD AUTO: 0.11 10*3/MM3 (ref 0–0.4)
EOSINOPHIL NFR BLD AUTO: 1.4 % (ref 0.3–6.2)
ERYTHROCYTE [DISTWIDTH] IN BLOOD BY AUTOMATED COUNT: 13.3 % (ref 12.3–15.4)
EXPIRATION DATE: ABNORMAL
GLUCOSE UR STRIP-MCNC: NEGATIVE MG/DL
HBV SURFACE AG SERPL QL IA: NORMAL
HCT VFR BLD AUTO: 36.5 % (ref 34–46.6)
HCV AB SER DONR QL: NORMAL
HGB BLD-MCNC: 12.6 G/DL (ref 12–15.9)
HIV1+2 AB SER QL: NORMAL
IMM GRANULOCYTES # BLD AUTO: 0.03 10*3/MM3 (ref 0–0.05)
IMM GRANULOCYTES NFR BLD AUTO: 0.4 % (ref 0–0.5)
INTERNAL NEGATIVE CONTROL: ABNORMAL
INTERNAL POSITIVE CONTROL: ABNORMAL
LYMPHOCYTES # BLD AUTO: 1.76 10*3/MM3 (ref 0.7–3.1)
LYMPHOCYTES NFR BLD AUTO: 22.2 % (ref 19.6–45.3)
Lab: ABNORMAL
MCH RBC QN AUTO: 29.6 PG (ref 26.6–33)
MCHC RBC AUTO-ENTMCNC: 34.5 G/DL (ref 31.5–35.7)
MCV RBC AUTO: 85.7 FL (ref 79–97)
MONOCYTES # BLD AUTO: 0.69 10*3/MM3 (ref 0.1–0.9)
MONOCYTES NFR BLD AUTO: 8.7 % (ref 5–12)
NEUTROPHILS NFR BLD AUTO: 5.33 10*3/MM3 (ref 1.7–7)
NEUTROPHILS NFR BLD AUTO: 67 % (ref 42.7–76)
NRBC BLD AUTO-RTO: 0 /100 WBC (ref 0–0.2)
PLATELET # BLD AUTO: 264 10*3/MM3 (ref 140–450)
PMV BLD AUTO: 10.1 FL (ref 6–12)
PROT UR STRIP-MCNC: NEGATIVE MG/DL
RBC # BLD AUTO: 4.26 10*6/MM3 (ref 3.77–5.28)
RH BLD: POSITIVE
T PALLIDUM IGG SER QL: NORMAL
WBC NRBC COR # BLD: 7.94 10*3/MM3 (ref 3.4–10.8)

## 2023-11-07 PROCEDURE — 86850 RBC ANTIBODY SCREEN: CPT | Performed by: OBSTETRICS & GYNECOLOGY

## 2023-11-07 PROCEDURE — 86803 HEPATITIS C AB TEST: CPT | Performed by: OBSTETRICS & GYNECOLOGY

## 2023-11-07 PROCEDURE — 85025 COMPLETE CBC W/AUTO DIFF WBC: CPT | Performed by: OBSTETRICS & GYNECOLOGY

## 2023-11-07 PROCEDURE — G0123 SCREEN CERV/VAG THIN LAYER: HCPCS

## 2023-11-07 PROCEDURE — 86780 TREPONEMA PALLIDUM: CPT | Performed by: OBSTETRICS & GYNECOLOGY

## 2023-11-07 PROCEDURE — 87591 N.GONORRHOEAE DNA AMP PROB: CPT

## 2023-11-07 PROCEDURE — 87661 TRICHOMONAS VAGINALIS AMPLIF: CPT

## 2023-11-07 PROCEDURE — 86900 BLOOD TYPING SEROLOGIC ABO: CPT | Performed by: OBSTETRICS & GYNECOLOGY

## 2023-11-07 PROCEDURE — 86901 BLOOD TYPING SEROLOGIC RH(D): CPT | Performed by: OBSTETRICS & GYNECOLOGY

## 2023-11-07 PROCEDURE — G0432 EIA HIV-1/HIV-2 SCREEN: HCPCS | Performed by: OBSTETRICS & GYNECOLOGY

## 2023-11-07 PROCEDURE — 87340 HEPATITIS B SURFACE AG IA: CPT | Performed by: OBSTETRICS & GYNECOLOGY

## 2023-11-07 PROCEDURE — 87086 URINE CULTURE/COLONY COUNT: CPT | Performed by: OBSTETRICS & GYNECOLOGY

## 2023-11-07 PROCEDURE — 87491 CHLMYD TRACH DNA AMP PROBE: CPT

## 2023-11-07 RX ORDER — PRENATAL VIT NO.126/IRON/FOLIC 28MG-0.8MG
TABLET ORAL DAILY
COMMUNITY

## 2023-11-07 NOTE — PATIENT INSTRUCTIONS
Venipuncture Blood Specimen Collection  Venipuncture performed in right arm by Jessica Dash with good hemostasis. Patient tolerated the procedure well without complications.   11/07/23   Jessica Dash

## 2023-11-07 NOTE — PROGRESS NOTES
OB Initial Visit    CC- Here for care of current pregnancy     Subjective:  28 y.o.  presenting for her first obstetrical visit.    LMP: Patient's last menstrual period was 2023 (approximate).     The patient reports occasional pelvic cramping.  She denies any vaginal bleeding.  She has concerns because she was on Lasix and phentermine at the time of conception.    Reviewed and updated:  OBHx, GYNHx (STDs), PMHx, Medications, Allergies, PSHx, Social Hx, Preventative Hx (PAP), Hx of abuse/safe environment, Vaccine Hx including hx of chickenpox or vaccine, Genetic Hx (pt, FOB, both families).        Objective:  /79   Wt 93.4 kg (206 lb)   LMP 2023 (Approximate)   BMI 36.50 kg/m²   General- NAD, alert and oriented, appropriate  Psych- Normal mood, good memory  Neck- No masses, no thyroid enlargement  CV- Regular rhythm, no murnurs  Resp- CTA to bases, no wheezes  Abdomen- Soft, non distended, non tender, no masses   External genitalia- Normal, no lesions  Urethra- Normal, no masses, non tender  Vagina- Normal, no discharge  Bladder- Normal, no masses, non tender  Cvx- Normal, no lesions, no discharge, no CMT  Uterus-normal, no masses, fundal height appropriate with gestational age  Adnexa- Normal, no mass, non tender  Lymphatic- No palpable neck, axillary, or groin nodes  Ext- No edema, no cyanosis    Skin- No lesions, no rashes, no acanthosis nigricans      Assessment and Plan:  Diagnoses and all orders for this visit:    1. Supervision of other normal pregnancy, antepartum (Primary)  Overview:  EDC:    Prenatal genetic screening: Undecided    Previous  delivery  Gallstones  Smoker  Obesity    COVID-19 vaccine: Done  Flu vaccine: Recommended  Tdap vaccine:  RSV vaccine:    Assessment & Plan:  Continue prenatal vitamins  Check prenatal labs  Check dating ultrasound  Discussed office visit schedule and call rotation  Reviewed medication safe in pregnancy  Undecided about prenatal  genetic screening  Reviewed nutrition, exercise, and appropriate weight gain in pregnancy  We discussed specifically the exposure of phentermine and Lasix in the early first trimester.  The patient was not experiencing any significant electrolyte abnormalities due to the Lasix.  We discussed risks are typically minimal with exposure to Lasix without the presence of electrolyte abnormalities during organogenesis.  We discussed phentermine is a stimulant drug.  There would not be any expected congenital birth defects associated with use of phentermine.  We discussed long-term use of phentermine could lead to low birthweight infants.    Orders:  -     POC Urinalysis Dipstick  -     POC Pregnancy, Urine  -     Urine Culture - Urine, Urine, Clean Catch  -     OB Panel With HIV; Future  -     IGP,CtNgTv,rfx Aptima HPV ASCU  -     US Ob < 14 Weeks Single or First Gestation; Future  -     OB Panel With HIV    2. Previous  delivery affecting pregnancy    3. Calculus of gallbladder without cholecystitis without obstruction  Assessment & Plan:  I have discussed with the patient that she should touch base with her surgeon to let them know that she is pregnant.  Currently she is asymptomatic.  We discussed that symptoms could increase during the pregnancy.  Whether or not to remove the gallbladder during pregnancy would be at the discretion of the surgeon and likely based on the severity of the patient's symptoms.      4. Tobacco use during pregnancy, antepartum  Assessment & Plan:  The patient is currently trying to quit.  Recommended continued total cessation              9w0d    Genetic Screening: Considering     Vaccines: Recommend FLU vaccine this season, R/B discussed  s/p COVID vaccine    Counseling: Nutrition discussed, calories, activity/exercise in pregnancy  Discussed dietary restrictions/safety food preparation in pregnancy  Reviewed what to expect prenatal visits, office providers (female and male) and  covering [facility]/Dr. Olivas  Appropriate trimester precautions provided, N/V, vag bleeding, cramping  VACCINE importance in pregnancy discussed.  Maternal and fetal risk of not being vaccinated reviewed NLT increased risk maternal/fetal severity of illness/death, PTD, CS, hemorrhage, HTN, possible IUFD.  Significant maternal and fetal/infant benefit w vaccination.  FDA approval and ACOG/SMFM/CDC strong recommendation in pregnancy.  Questions answered.   Questions answered      Return in about 4 weeks (around 12/5/2023) for Recheck.      Drew Jennings MD  11/07/2023

## 2023-11-08 PROBLEM — O99.210 OBESITY AFFECTING PREGNANCY, ANTEPARTUM: Status: ACTIVE | Noted: 2023-11-08

## 2023-11-08 LAB — BACTERIA SPEC AEROBE CULT: NO GROWTH

## 2023-11-08 NOTE — ASSESSMENT & PLAN NOTE
Continue prenatal vitamins  Check prenatal labs  Check dating ultrasound  Discussed office visit schedule and call rotation  Reviewed medication safe in pregnancy  Undecided about prenatal genetic screening  Reviewed nutrition, exercise, and appropriate weight gain in pregnancy  We discussed specifically the exposure of phentermine and Lasix in the early first trimester.  The patient was not experiencing any significant electrolyte abnormalities due to the Lasix.  We discussed risks are typically minimal with exposure to Lasix without the presence of electrolyte abnormalities during organogenesis.  We discussed phentermine is a stimulant drug.  There would not be any expected congenital birth defects associated with use of phentermine.  We discussed long-term use of phentermine could lead to low birthweight infants.

## 2023-11-08 NOTE — ASSESSMENT & PLAN NOTE
I have discussed with the patient that she should touch base with her surgeon to let them know that she is pregnant.  Currently she is asymptomatic.  We discussed that symptoms could increase during the pregnancy.  Whether or not to remove the gallbladder during pregnancy would be at the discretion of the surgeon and likely based on the severity of the patient's symptoms.

## 2023-11-08 NOTE — ASSESSMENT & PLAN NOTE
Discussed exercise, nutrition and appropriate weight gain during pregnancy  Plan for NSTs towards the end of the third trimester

## 2023-11-09 LAB — RUBV IGG SERPL IA-ACNC: 5.7 INDEX

## 2023-11-10 LAB
C TRACH RRNA CVX QL NAA+PROBE: NEGATIVE
CONV .: NORMAL
CYTOLOGIST CVX/VAG CYTO: NORMAL
CYTOLOGY CVX/VAG DOC CYTO: NORMAL
CYTOLOGY CVX/VAG DOC THIN PREP: NORMAL
DX ICD CODE: NORMAL
HIV 1 & 2 AB SER-IMP: NORMAL
N GONORRHOEA RRNA CVX QL NAA+PROBE: NEGATIVE
OTHER STN SPEC: NORMAL
STAT OF ADQ CVX/VAG CYTO-IMP: NORMAL
T VAGINALIS RRNA SPEC QL NAA+PROBE: NEGATIVE

## 2023-12-05 ENCOUNTER — ROUTINE PRENATAL (OUTPATIENT)
Dept: OBSTETRICS AND GYNECOLOGY | Facility: CLINIC | Age: 28
End: 2023-12-05
Payer: COMMERCIAL

## 2023-12-05 ENCOUNTER — TELEPHONE (OUTPATIENT)
Dept: OBSTETRICS AND GYNECOLOGY | Facility: CLINIC | Age: 28
End: 2023-12-05
Payer: COMMERCIAL

## 2023-12-05 VITALS — WEIGHT: 205.5 LBS | BODY MASS INDEX: 36.41 KG/M2 | DIASTOLIC BLOOD PRESSURE: 78 MMHG | SYSTOLIC BLOOD PRESSURE: 125 MMHG

## 2023-12-05 DIAGNOSIS — O99.210 OBESITY AFFECTING PREGNANCY, ANTEPARTUM, UNSPECIFIED OBESITY TYPE: ICD-10-CM

## 2023-12-05 DIAGNOSIS — O99.330 TOBACCO USE DURING PREGNANCY, ANTEPARTUM: ICD-10-CM

## 2023-12-05 DIAGNOSIS — O34.219 PREVIOUS CESAREAN DELIVERY AFFECTING PREGNANCY: ICD-10-CM

## 2023-12-05 DIAGNOSIS — Z34.80 SUPERVISION OF OTHER NORMAL PREGNANCY, ANTEPARTUM: ICD-10-CM

## 2023-12-05 LAB
GLUCOSE UR STRIP-MCNC: NEGATIVE MG/DL
PROT UR STRIP-MCNC: NEGATIVE MG/DL

## 2023-12-05 NOTE — PROGRESS NOTES
OB FOLLOW UP    CC: Scheduled OB routine FU     Prenatal care complicated by:   Patient Active Problem List   Diagnosis    Calculus of gallbladder without cholecystitis without obstruction    Previous  delivery affecting pregnancy    Supervision of other normal pregnancy, antepartum    Tobacco use during pregnancy, antepartum    Obesity affecting pregnancy, antepartum       Subjective:   Patient has: No complaints, No leaking fluid, No vaginal bleeding, No contractions  Nausea is greatly improved.    Objective:  Urine glucose/protein- see flow sheet      /78   Wt 93.2 kg (205 lb 8 oz)   LMP 2023 (Approximate)   BMI 36.41 kg/m²   See OB flow for LE edema, and cvx exam if applicable  FHT: 159 BPM   Uterine Size: size equals dates      Assessment and Plan:  Diagnoses and all orders for this visit:    1. Supervision of other normal pregnancy, antepartum  Overview:  EDC: 2024    Prenatal genetic screening: Undecided    Previous  delivery  Gallstones  Smoker  Obesity    COVID-19 vaccine: Done  Flu vaccine: Recommended  Tdap vaccine:  RSV vaccine:    Assessment & Plan:  Continue prenatal vitamin  Anatomy ultrasound scheduled  Reviewed initial prenatal labs  EDC dates reviewed    Orders:  -     POC Urinalysis Dipstick  -     US Ob Detail Fetal Anatomy Single or First Gestation    2. Previous  delivery affecting pregnancy    3. Obesity affecting pregnancy, antepartum, unspecified obesity type  Assessment & Plan:  Discussed exercise, nutrition and appropriate weight gain in pregnancy.  Doing well thus far.  We will monitor closely as nausea has improved.      4. Tobacco use during pregnancy, antepartum  Assessment & Plan:  Recommend cessation            13w0d  Reassuring pregnancy progress    Counseling: Second trimester precautions  OB precautions, leaking, VB, ashu high vs PTL/Labor    Questions answered    Return in about 4 weeks (around 2024) for Recheck.      Drew  Brandon Jennings MD  12/05/2023

## 2023-12-06 NOTE — ASSESSMENT & PLAN NOTE
Discussed exercise, nutrition and appropriate weight gain in pregnancy.  Doing well thus far.  We will monitor closely as nausea has improved.

## 2023-12-06 NOTE — ASSESSMENT & PLAN NOTE
Continue prenatal vitamin  Anatomy ultrasound scheduled  Reviewed initial prenatal labs  EDC dates reviewed

## 2024-01-05 ENCOUNTER — ROUTINE PRENATAL (OUTPATIENT)
Dept: OBSTETRICS AND GYNECOLOGY | Facility: CLINIC | Age: 29
End: 2024-01-05
Payer: COMMERCIAL

## 2024-01-05 ENCOUNTER — TELEPHONE (OUTPATIENT)
Dept: OBSTETRICS AND GYNECOLOGY | Facility: CLINIC | Age: 29
End: 2024-01-05

## 2024-01-05 VITALS — WEIGHT: 204 LBS | BODY MASS INDEX: 36.15 KG/M2 | DIASTOLIC BLOOD PRESSURE: 78 MMHG | SYSTOLIC BLOOD PRESSURE: 117 MMHG

## 2024-01-05 DIAGNOSIS — K08.89 PAIN, DENTAL: ICD-10-CM

## 2024-01-05 DIAGNOSIS — Z34.80 SUPERVISION OF OTHER NORMAL PREGNANCY, ANTEPARTUM: Primary | ICD-10-CM

## 2024-01-05 DIAGNOSIS — O34.219 PREVIOUS CESAREAN DELIVERY AFFECTING PREGNANCY: ICD-10-CM

## 2024-01-05 DIAGNOSIS — O99.210 OBESITY AFFECTING PREGNANCY, ANTEPARTUM, UNSPECIFIED OBESITY TYPE: ICD-10-CM

## 2024-01-05 DIAGNOSIS — O99.330 TOBACCO USE DURING PREGNANCY, ANTEPARTUM: ICD-10-CM

## 2024-01-05 LAB
GLUCOSE UR STRIP-MCNC: NEGATIVE MG/DL
PROT UR STRIP-MCNC: NEGATIVE MG/DL

## 2024-01-05 RX ORDER — ACETAMINOPHEN 500 MG
500 TABLET ORAL EVERY 6 HOURS PRN
COMMUNITY

## 2024-01-05 NOTE — ASSESSMENT & PLAN NOTE
The patient reports she had a significant mount of dental work done December 19.  She has been taking Tylenol for the subsequent pain.  She reports that she is taking at 1000 mg sometimes every 4 hours.  She was wondering if this was okay.  I recommend she continue more than 4 g of Tylenol in a 24-hour period which would be 1000 mg no more than every 6 hours.  She inquired about other options to treat her dental pain.  I recommended that she reach out to the dentist who performed the procedures and discuss an appropriate analgesic for her symptoms.  I advised against NSAIDs such as ibuprofen, naproxen, Aleve, Motrin.  If the dentist has questions or concerns about appropriate analgesia then we certainly could have a conversation about what would be appropriate during pregnancy, but given this is related to procedures done by the dentist I think it inappropriate for me to prescribe pain medication outside of the supervision of that dentist.

## 2024-01-05 NOTE — TELEPHONE ENCOUNTER
THAO JOSHUA 01/05/2024 Pt is aware of being out of network and has applied for continuity of care, [employer] is to fax the letter to [email or web address]

## 2024-01-05 NOTE — PROGRESS NOTES
OB FOLLOW UP    CC: Scheduled OB routine FU     Prenatal care complicated by:   Patient Active Problem List   Diagnosis    Calculus of gallbladder without cholecystitis without obstruction    Previous  delivery affecting pregnancy    Supervision of other normal pregnancy, antepartum    Tobacco use during pregnancy, antepartum    Obesity affecting pregnancy, antepartum    Pain, dental     Subjective:   Patient has: No complaints, No leaking fluid, No vaginal bleeding, No contractions    Objective:  Urine glucose/protein- see flow sheet      /78   Wt 92.5 kg (204 lb)   LMP 2023 (Approximate)   BMI 36.15 kg/m²   See OB flow for LE edema, and cvx exam if applicable  FHT: 152 BPM   Uterine Size: size less than dates      Assessment and Plan:  Diagnoses and all orders for this visit:    1. Supervision of other normal pregnancy, antepartum (Primary)  Overview:  EDC: 2024    Prenatal genetic screening: Undecided    Previous  delivery  Gallstones  Smoker  Obesity    COVID-19 vaccine: Done  Flu vaccine: Recommended  Tdap vaccine:  RSV vaccine:    Assessment & Plan:  Continue prenatal vitamin  Anatomy ultrasound next office visit      Orders:  -     POC Urinalysis Dipstick    2. Previous  delivery affecting pregnancy  Overview:   calculator 52% chance of success  Recommend repeat  delivery      3. Obesity affecting pregnancy, antepartum, unspecified obesity type  Assessment & Plan:  Discussed exercise, nutrition and appropriate radiographic      4. Tobacco use during pregnancy, antepartum  Assessment & Plan:  Recommend smoking cessation      5. Pain, dental  Assessment & Plan:  The patient reports she had a significant mount of dental work done .  She has been taking Tylenol for the subsequent pain.  She reports that she is taking at 1000 mg sometimes every 4 hours.  She was wondering if this was okay.  I recommend she continue more than 4 g of Tylenol in a  24-hour period which would be 1000 mg no more than every 6 hours.  She inquired about other options to treat her dental pain.  I recommended that she reach out to the dentist who performed the procedures and discuss an appropriate analgesic for her symptoms.  I advised against NSAIDs such as ibuprofen, naproxen, Aleve, Motrin.  If the dentist has questions or concerns about appropriate analgesia then we certainly could have a conversation about what would be appropriate during pregnancy, but given this is related to procedures done by the dentist I think it inappropriate for me to prescribe pain medication outside of the supervision of that dentist.            17w3d  Reassuring pregnancy progress    Counseling: Second trimester precautions  OB precautions, leaking, VB, ashu high vs PTL/Labor    Questions answered    Return in about 4 weeks (around 2/2/2024) for Recheck.      Drew Jennings MD  01/05/2024

## 2024-01-05 NOTE — TELEPHONE ENCOUNTER
"  [facility] staff attempted to follow warm transfer process and was unsuccessful     Caller: Mali Mercer    Relationship to patient: Self    Best call back number: [phone]    Patient is needing: PATIENT CALLED AND STATED THAT SHE JUST SPOKE W/ [employer] AND WAS ADVISED THAT HER CONTINUITY OF CARE  ON THE  BUT SHE DID REAPPLY ON THE  BUT IT HASN'T BEEN APPROVED YET - THEY TOLD HER THAT IF HER PROVIDER CALLS THEM AT THE \"PROVIDER LINE\" THAT IT CAN BE APPROVED FAST     PATIENT IS SCHEDULED FOR OB F/U TODAY AT 10:40     PATIENT DID NOT HAVE THE PHONE NUMBER FOR THE PROVIDER LINE, BUT THE [employer] # [phone]  "

## 2024-01-30 ENCOUNTER — ROUTINE PRENATAL (OUTPATIENT)
Dept: OBSTETRICS AND GYNECOLOGY | Facility: CLINIC | Age: 29
End: 2024-01-30
Payer: COMMERCIAL

## 2024-01-30 VITALS — SYSTOLIC BLOOD PRESSURE: 116 MMHG | WEIGHT: 206 LBS | DIASTOLIC BLOOD PRESSURE: 80 MMHG | BODY MASS INDEX: 36.5 KG/M2

## 2024-01-30 DIAGNOSIS — O21.9 NAUSEA AND VOMITING DURING PREGNANCY: ICD-10-CM

## 2024-01-30 DIAGNOSIS — O34.219 PREVIOUS CESAREAN DELIVERY AFFECTING PREGNANCY: ICD-10-CM

## 2024-01-30 DIAGNOSIS — O99.330 TOBACCO USE DURING PREGNANCY, ANTEPARTUM: ICD-10-CM

## 2024-01-30 DIAGNOSIS — Z34.80 SUPERVISION OF OTHER NORMAL PREGNANCY, ANTEPARTUM: Primary | ICD-10-CM

## 2024-01-30 DIAGNOSIS — O99.210 OBESITY AFFECTING PREGNANCY, ANTEPARTUM, UNSPECIFIED OBESITY TYPE: ICD-10-CM

## 2024-01-30 LAB
GLUCOSE UR STRIP-MCNC: NEGATIVE MG/DL
PROT UR STRIP-MCNC: NEGATIVE MG/DL

## 2024-01-30 PROCEDURE — 0502F SUBSEQUENT PRENATAL CARE: CPT | Performed by: OBSTETRICS & GYNECOLOGY

## 2024-01-30 RX ORDER — ONDANSETRON 4 MG/1
4 TABLET, FILM COATED ORAL EVERY 8 HOURS PRN
Qty: 30 TABLET | Refills: 1 | Status: SHIPPED | OUTPATIENT
Start: 2024-01-30 | End: 2025-01-29

## 2024-01-30 NOTE — ASSESSMENT & PLAN NOTE
Reviewed today's anatomy ultrasound.  The anatomy appears normal.  Normal ZACHARIAH.  Anterior placenta.  Normal cervical length.  Continue prenatal vitamin

## 2024-01-30 NOTE — PROGRESS NOTES
OB FOLLOW UP    CC: Scheduled OB routine FU     Prenatal care complicated by:   Patient Active Problem List   Diagnosis    Calculus of gallbladder without cholecystitis without obstruction    Previous  delivery affecting pregnancy    Supervision of other normal pregnancy, antepartum    Tobacco use during pregnancy, antepartum    Obesity affecting pregnancy, antepartum    Pain, dental       Subjective:   Patient has:  No leaking fluid, No vaginal bleeding, No contractions, Adequate FM  Patient reports she is still having some nausea.  Requesting nausea medication.  She also complains of significant fatigue.    Objective:  Urine glucose/protein- see flow sheet      /80   Wt 93.4 kg (206 lb)   LMP 2023 (Approximate)   BMI 36.50 kg/m²   See OB flow for LE edema, and cvx exam if applicable  FHT: 157 BPM   Uterine Size:  20 cm      Assessment and Plan:  Diagnoses and all orders for this visit:    1. Supervision of other normal pregnancy, antepartum (Primary)  Overview:  EDC: 2024    Prenatal genetic screening: Undecided    Previous  delivery  Gallstones  Smoker  Obesity    COVID-19 vaccine: Done  Flu vaccine: Recommended  Tdap vaccine:  RSV vaccine:    Assessment & Plan:  Reviewed today's anatomy ultrasound.  The anatomy appears normal.  Normal ZACHARIAH.  Anterior placenta.  Normal cervical length.  Continue prenatal vitamin    Orders:  -     POC Urinalysis Dipstick    2. Previous  delivery affecting pregnancy  Overview:   calculator 52% chance of success  Recommend repeat  delivery      3. Obesity affecting pregnancy, antepartum, unspecified obesity type  Assessment & Plan:  Discussed exercise, nutrition and appropriate weight gain in pregnancy      4. Tobacco use during pregnancy, antepartum  Assessment & Plan:  Recommend smoking cessation      5. Nausea and vomiting during pregnancy  -     ondansetron (Zofran) 4 MG tablet; Take 1 tablet by mouth Every 8 (Eight) Hours  As Needed for Nausea or Vomiting.  Dispense: 30 tablet; Refill: 1          21w0d  Reassuring pregnancy progress    Counseling: Second trimester precautions  OB precautions, leaking, VB, ashu high vs PTL/Labor    Questions answered    Return in about 4 weeks (around 2/27/2024) for Recheck.      Drew Jennings MD  01/30/2024

## 2024-02-23 ENCOUNTER — OFFICE VISIT (OUTPATIENT)
Dept: FAMILY MEDICINE CLINIC | Facility: CLINIC | Age: 29
End: 2024-02-23
Payer: COMMERCIAL

## 2024-02-23 VITALS
WEIGHT: 208 LBS | RESPIRATION RATE: 16 BRPM | TEMPERATURE: 98 F | HEART RATE: 95 BPM | DIASTOLIC BLOOD PRESSURE: 60 MMHG | OXYGEN SATURATION: 99 % | SYSTOLIC BLOOD PRESSURE: 122 MMHG | BODY MASS INDEX: 36.86 KG/M2 | HEIGHT: 63 IN

## 2024-02-23 DIAGNOSIS — Z34.90 PREGNANCY, UNSPECIFIED GESTATIONAL AGE: ICD-10-CM

## 2024-02-23 DIAGNOSIS — E66.9 OBESITY (BMI 30-39.9): Primary | ICD-10-CM

## 2024-02-23 NOTE — PROGRESS NOTES
"Chief Complaint  Follow-up visit for weight    Subjective         Mali Mehreen Mercer is a 28 y.o. female who presents to [facility]    28 years old comes to the clinic today to follow-up.    Patient is currently pregnant and following up with OB/GYN    Patient reports no complications during pregnancy.  This is her second pregnancy.    Patient would like to see me after her pregnancy to follow-up on weight and possibly retrying weight loss medications.    12+ review of systems are unremarkable otherwise          Objective   Vital Signs:   Vitals:    02/23/24 0855   BP: 122/60   Pulse: 95   Resp: 16   Temp: 98 °F (36.7 °C)   SpO2: 99%   Weight: 94.3 kg (208 lb)   Height: 160 cm (63\")      Body mass index is 36.85 kg/m².   Wt Readings from Last 3 Encounters:   02/23/24 94.3 kg (208 lb)   01/30/24 93.4 kg (206 lb)   01/05/24 92.5 kg (204 lb)      BP Readings from Last 3 Encounters:   02/23/24 122/60   01/30/24 116/80   01/05/24 117/78        Patient Care Team:  Kellen Casanova MD as PCP - General (Family Medicine)     Physical Exam  Vitals reviewed.   Constitutional:       Appearance: Normal appearance. She is well-developed.   HENT:      Head: Normocephalic and atraumatic.      Right Ear: External ear normal.      Left Ear: External ear normal.      Mouth/Throat:      Pharynx: No oropharyngeal exudate.   Eyes:      Conjunctiva/sclera: Conjunctivae normal.      Pupils: Pupils are equal, round, and reactive to light.   Cardiovascular:      Rate and Rhythm: Normal rate and regular rhythm.      Heart sounds: No murmur heard.     No friction rub. No gallop.   Pulmonary:      Effort: Pulmonary effort is normal.      Breath sounds: Normal breath sounds. No wheezing or rhonchi.   Abdominal:      General: Bowel sounds are normal. There is no distension.      Palpations: Abdomen is soft.      Tenderness: There is no abdominal tenderness.   Skin:     General: Skin is warm and dry. "   Neurological:      Mental Status: She is alert and oriented to person, place, and time.      Cranial Nerves: No cranial nerve deficit.   Psychiatric:         Mood and Affect: Mood and affect normal.         Behavior: Behavior normal.         Thought Content: Thought content normal.         Judgment: Judgment normal.                            Assessment and Plan   Diagnoses and all orders for this visit:    1. Obesity (BMI 30-39.9) (Primary)    2. Pregnancy, unspecified gestational age      We will go ahead and follow-up with patient after pregnancy for weight.  We may consider weight loss medications after pregnancy.  I have advised patient to not think about any restrictions at this time as she is pregnant and will need all the nutritions/healthy diet    Continue with physical activities as tolerated    Continue with OB/GYN and prenatal vitamins.    Tobacco Use: Medium Risk (2/23/2024)    Patient History     Smoking Tobacco Use: Former     Smokeless Tobacco Use: Never     Passive Exposure: Yes            Follow Up   Return if symptoms worsen or fail to improve.  Patient was given instructions and counseling regarding her condition or for health maintenance advice. Please see specific information pulled into the AVS if appropriate.

## 2024-02-26 ENCOUNTER — ROUTINE PRENATAL (OUTPATIENT)
Dept: OBSTETRICS AND GYNECOLOGY | Facility: CLINIC | Age: 29
End: 2024-02-26
Payer: COMMERCIAL

## 2024-02-26 VITALS — WEIGHT: 210 LBS | BODY MASS INDEX: 37.2 KG/M2 | SYSTOLIC BLOOD PRESSURE: 125 MMHG | DIASTOLIC BLOOD PRESSURE: 77 MMHG

## 2024-02-26 DIAGNOSIS — O99.330 TOBACCO USE DURING PREGNANCY, ANTEPARTUM: ICD-10-CM

## 2024-02-26 DIAGNOSIS — O99.210 OBESITY AFFECTING PREGNANCY, ANTEPARTUM, UNSPECIFIED OBESITY TYPE: ICD-10-CM

## 2024-02-26 DIAGNOSIS — Z34.80 SUPERVISION OF OTHER NORMAL PREGNANCY, ANTEPARTUM: Primary | ICD-10-CM

## 2024-02-26 DIAGNOSIS — O34.219 PREVIOUS CESAREAN DELIVERY AFFECTING PREGNANCY: ICD-10-CM

## 2024-02-26 LAB
DEPRECATED RDW RBC AUTO: 42 FL (ref 37–54)
ERYTHROCYTE [DISTWIDTH] IN BLOOD BY AUTOMATED COUNT: 13.5 % (ref 12.3–15.4)
GLUCOSE 1H P GLC SERPL-MCNC: 146 MG/DL (ref 65–139)
GLUCOSE UR STRIP-MCNC: NEGATIVE MG/DL
HCT VFR BLD AUTO: 31.8 % (ref 34–46.6)
HGB BLD-MCNC: 10.6 G/DL (ref 12–15.9)
MCH RBC QN AUTO: 28.7 PG (ref 26.6–33)
MCHC RBC AUTO-ENTMCNC: 33.3 G/DL (ref 31.5–35.7)
MCV RBC AUTO: 86.2 FL (ref 79–97)
PLATELET # BLD AUTO: 269 10*3/MM3 (ref 140–450)
PMV BLD AUTO: 10.2 FL (ref 6–12)
PROT UR STRIP-MCNC: NEGATIVE MG/DL
RBC # BLD AUTO: 3.69 10*6/MM3 (ref 3.77–5.28)
WBC NRBC COR # BLD AUTO: 8.78 10*3/MM3 (ref 3.4–10.8)

## 2024-02-26 PROCEDURE — 85027 COMPLETE CBC AUTOMATED: CPT | Performed by: OBSTETRICS & GYNECOLOGY

## 2024-02-26 PROCEDURE — 82950 GLUCOSE TEST: CPT | Performed by: OBSTETRICS & GYNECOLOGY

## 2024-02-26 NOTE — ASSESSMENT & PLAN NOTE
Discussed exercise, nutrition and appropriate weight gain in pregnancy.  Thus far the patient's weight gain has been appropriate.

## 2024-02-26 NOTE — PROGRESS NOTES
OB FOLLOW UP    CC: Scheduled OB routine FU     Prenatal care complicated by:   Patient Active Problem List   Diagnosis    Calculus of gallbladder without cholecystitis without obstruction    Previous  delivery affecting pregnancy    Supervision of other normal pregnancy, antepartum    Tobacco use during pregnancy, antepartum    Obesity affecting pregnancy, antepartum    Pain, dental       Subjective:   Patient has: No complaints, No leaking fluid, No vaginal bleeding, No contractions, Adequate FM    Objective:  Urine glucose/protein- see flow sheet      /77   Wt 95.3 kg (210 lb)   LMP 2023 (Approximate)   BMI 37.20 kg/m²   See OB flow for LE edema, and cvx exam if applicable  FHT: 153 BPM   Uterine Size:  24 cm      Assessment and Plan:  Diagnoses and all orders for this visit:    1. Supervision of other normal pregnancy, antepartum (Primary)  Overview:  EDC: 2024    Prenatal genetic screening: Undecided    Previous  delivery  Gallstones  Smoker  Obesity    COVID-19 vaccine: Done  Flu vaccine: Recommended  Tdap vaccine:  RSV vaccine:    Assessment & Plan:  Continue prenatal vitamins  Fetal kick counts   labor warnings  1 hour GTT today    Orders:  -     POC Urinalysis Dipstick  -     CBC (No Diff)  -     Gestational Diabetes Screen 1 Hour    2. Previous  delivery affecting pregnancy  Overview:   calculator 52% chance of success  Recommend repeat  delivery      3. Obesity affecting pregnancy, antepartum, unspecified obesity type  Assessment & Plan:  Discussed exercise, nutrition and appropriate weight gain in pregnancy.  Thus far the patient's weight gain has been appropriate.      4. Tobacco use during pregnancy, antepartum  Assessment & Plan:  Recommend cessation            24w6d  Reassuring pregnancy progress    Counseling: OB precautions, leaking, VB, ashu high vs PTL/Labor  [facility]    Questions answered    Return in about 4 weeks (around 3/25/2024)  for Recheck.      Drew Jennings MD  02/26/2024

## 2024-02-27 DIAGNOSIS — O99.019 MATERNAL ANEMIA IN PREGNANCY, ANTEPARTUM: Primary | ICD-10-CM

## 2024-02-27 DIAGNOSIS — Z34.80 SUPERVISION OF OTHER NORMAL PREGNANCY, ANTEPARTUM: ICD-10-CM

## 2024-02-27 RX ORDER — FERROUS SULFATE 325(65) MG
325 TABLET ORAL EVERY OTHER DAY
Qty: 30 TABLET | Refills: 3 | Status: SHIPPED | OUTPATIENT
Start: 2024-02-27

## 2024-03-08 ENCOUNTER — LAB (OUTPATIENT)
Dept: LAB | Facility: HOSPITAL | Age: 29
End: 2024-03-08
Payer: COMMERCIAL

## 2024-03-08 DIAGNOSIS — Z34.80 SUPERVISION OF OTHER NORMAL PREGNANCY, ANTEPARTUM: ICD-10-CM

## 2024-03-08 LAB
GLUCOSE 1H P 100 G GLC PO SERPL-MCNC: 185 MG/DL (ref 74–180)
GLUCOSE 2H P 100 G GLC PO SERPL-MCNC: 141 MG/DL (ref 74–155)
GLUCOSE 3H P 100 G GLC PO SERPL-MCNC: 63 MG/DL (ref 74–140)
GLUCOSE BLDC GLUCOMTR-MCNC: 94 MG/DL (ref 70–99)
GLUCOSE P FAST SERPL-MCNC: 86 MG/DL (ref 74–106)

## 2024-03-08 PROCEDURE — 82951 GLUCOSE TOLERANCE TEST (GTT): CPT

## 2024-03-08 PROCEDURE — 36415 COLL VENOUS BLD VENIPUNCTURE: CPT

## 2024-03-08 PROCEDURE — 82952 GTT-ADDED SAMPLES: CPT

## 2024-03-24 NOTE — PROGRESS NOTES
OB Follow Up    CC: Routine obstetrical follow up    Prenatal care complicated by:  Patient Active Problem List   Diagnosis    Calculus of gallbladder without cholecystitis without obstruction    Previous  delivery affecting pregnancy    Supervision of other normal pregnancy, antepartum    Tobacco use during pregnancy, antepartum    Obesity affecting pregnancy, antepartum    Pain, dental    Low weight gain during pregnancy in third trimester     Subjective:   Patient has: No complaints, No leaking fluid, No vaginal bleeding, No contractions, Adequate FM    History: Past medical and surgical history, medications, allergies, social history, and obstetrical history all reviewed and updated.    Objective:    Urine glucose/protein - See OB flow sheet      /74   Wt 95.3 kg (210 lb)   LMP 2023 (Approximate)   BMI 37.20 kg/m²     See OB flowsheet for applicable exam findings  FHR: 148 BPM   Uterine Size:  29 cm    Assessment and Plan:  Diagnoses and all orders for this visit:    1. Supervision of other normal pregnancy, antepartum (Primary)  Overview:  EDC: 2024    Prenatal genetic screening: Undecided    Previous  delivery  Gallstones  Smoker  Obesity    COVID-19 vaccine: Done  Flu vaccine: Recommended  Tdap vaccine:  RSV vaccine:    Assessment & Plan:  1 hour GTT was elevated but 3-hour GTT was normal  Continue prenatal vitamins  Fetal kick counts   labor warnings    Orders:  -     POC Urinalysis Dipstick    2. Previous  delivery affecting pregnancy  Overview:   calculator 52% chance of success  Recommend repeat  delivery      3. Obesity affecting pregnancy, antepartum, unspecified obesity type    4. Low weight gain during pregnancy in third trimester  Assessment & Plan:  Check growth ultrasound    Orders:  -     US Ob 14 + Weeks Single or First Gestation; Future    5. Tobacco use during pregnancy, antepartum  Assessment & Plan:  Recommend  cessation          28w5d  Reassuring pregnancy progress    Counseling: OB precautions, leaking, VB, ashu high vs PTL/Labor  [facility]    Questions answered    Return in about 2 weeks (around 4/9/2024) for Recheck.    Drew Jennings MD  03/26/2024

## 2024-03-26 ENCOUNTER — ROUTINE PRENATAL (OUTPATIENT)
Dept: OBSTETRICS AND GYNECOLOGY | Facility: CLINIC | Age: 29
End: 2024-03-26
Payer: COMMERCIAL

## 2024-03-26 ENCOUNTER — TELEPHONE (OUTPATIENT)
Dept: OBSTETRICS AND GYNECOLOGY | Facility: CLINIC | Age: 29
End: 2024-03-26
Payer: COMMERCIAL

## 2024-03-26 VITALS — SYSTOLIC BLOOD PRESSURE: 116 MMHG | WEIGHT: 210 LBS | BODY MASS INDEX: 37.2 KG/M2 | DIASTOLIC BLOOD PRESSURE: 74 MMHG

## 2024-03-26 DIAGNOSIS — O99.330 TOBACCO USE DURING PREGNANCY, ANTEPARTUM: ICD-10-CM

## 2024-03-26 DIAGNOSIS — O34.219 PREVIOUS CESAREAN DELIVERY AFFECTING PREGNANCY: ICD-10-CM

## 2024-03-26 DIAGNOSIS — Z34.80 SUPERVISION OF OTHER NORMAL PREGNANCY, ANTEPARTUM: Primary | ICD-10-CM

## 2024-03-26 DIAGNOSIS — O99.210 OBESITY AFFECTING PREGNANCY, ANTEPARTUM, UNSPECIFIED OBESITY TYPE: ICD-10-CM

## 2024-03-26 DIAGNOSIS — O26.13 LOW WEIGHT GAIN DURING PREGNANCY IN THIRD TRIMESTER: ICD-10-CM

## 2024-03-26 LAB
GLUCOSE UR STRIP-MCNC: NEGATIVE MG/DL
PROT UR STRIP-MCNC: NEGATIVE MG/DL

## 2024-03-26 PROCEDURE — 0502F SUBSEQUENT PRENATAL CARE: CPT | Performed by: OBSTETRICS & GYNECOLOGY

## 2024-03-26 NOTE — ASSESSMENT & PLAN NOTE
1 hour GTT was elevated but 3-hour GTT was normal  Continue prenatal vitamins  Fetal kick counts   labor warnings

## 2024-04-09 ENCOUNTER — TELEPHONE (OUTPATIENT)
Dept: OBSTETRICS AND GYNECOLOGY | Facility: CLINIC | Age: 29
End: 2024-04-09
Payer: COMMERCIAL

## 2024-04-09 NOTE — TELEPHONE ENCOUNTER
Left message regarding appointment that she was not able to be seen for due to being late. We need to reschedule this.

## 2024-04-24 ENCOUNTER — ROUTINE PRENATAL (OUTPATIENT)
Dept: OBSTETRICS AND GYNECOLOGY | Facility: CLINIC | Age: 29
End: 2024-04-24
Payer: COMMERCIAL

## 2024-04-24 VITALS — DIASTOLIC BLOOD PRESSURE: 81 MMHG | SYSTOLIC BLOOD PRESSURE: 118 MMHG | BODY MASS INDEX: 36.85 KG/M2 | WEIGHT: 208 LBS

## 2024-04-24 DIAGNOSIS — Z34.80 SUPERVISION OF OTHER NORMAL PREGNANCY, ANTEPARTUM: Primary | ICD-10-CM

## 2024-04-24 DIAGNOSIS — O26.13 LOW WEIGHT GAIN DURING PREGNANCY IN THIRD TRIMESTER: ICD-10-CM

## 2024-04-24 DIAGNOSIS — O34.219 PREVIOUS CESAREAN DELIVERY AFFECTING PREGNANCY: ICD-10-CM

## 2024-04-24 DIAGNOSIS — O99.330 TOBACCO USE DURING PREGNANCY, ANTEPARTUM: ICD-10-CM

## 2024-04-24 DIAGNOSIS — O99.210 OBESITY AFFECTING PREGNANCY, ANTEPARTUM, UNSPECIFIED OBESITY TYPE: ICD-10-CM

## 2024-04-24 LAB
GLUCOSE UR STRIP-MCNC: NEGATIVE MG/DL
PROT UR STRIP-MCNC: NEGATIVE MG/DL

## 2024-04-24 PROCEDURE — 0502F SUBSEQUENT PRENATAL CARE: CPT | Performed by: OBSTETRICS & GYNECOLOGY

## 2024-04-24 NOTE — PROGRESS NOTES
[facility]  OB Follow Up Visit    CC: Routine obstetrical visit    Prenatal care complicated by:  Patient Active Problem List   Diagnosis    Calculus of gallbladder without cholecystitis without obstruction    Previous  delivery affecting pregnancy    Supervision of other normal pregnancy, antepartum    Tobacco use during pregnancy, antepartum    Obesity affecting pregnancy, antepartum    Pain, dental    Low weight gain during pregnancy in third trimester     Subjective:   Mali Mercer is a 28 y.o.  33w1d patient being seen today for her obstetrical follow up visit. The patient has: No complaints, No leaking fluid, No vaginal bleeding, No contractions, Adequate FM    History: Past medical and surgical history, medications, allergies, social history, and obstetrical history all reviewed and updated.    Objective:    Urine glucose/protein - See OB flow sheet      /81   Wt 94.3 kg (208 lb)   LMP 2023 (Approximate)   BMI 36.85 kg/m²     General exam: Comfortable, NAD  FHR: 163 BPM   Uterine Size:  33 cm  Pelvic Exam: No    Assessment and Plan:  Diagnoses and all orders for this visit:    1. Supervision of other normal pregnancy, antepartum (Primary)  Overview:  EDC: 2024    Prenatal genetic screening: Undecided    Previous  delivery  Gallstones  Smoker  Obesity    COVID-19 vaccine: Done  Flu vaccine: Recommended  Tdap vaccine:  RSV vaccine:    Assessment & Plan:  Continue prenatal vitamins  Fetal kick counts   labor warnings    Orders:  -     POC Urinalysis Dipstick    2. Previous  delivery affecting pregnancy  Overview:   calculator 52% chance of success  Recommend repeat  delivery  Anterior placenta      3. Obesity affecting pregnancy, antepartum, unspecified obesity type  Assessment & Plan:  Discussed exercise, nutrition and appropriate weight gain in pregnancy.  The patient is only gained 5 pounds thus far.      4. Low  weight gain during pregnancy in third trimester  Overview:  4/24/2024: EFW is 5 pounds 3 ounces, 2340 g.  This is the 69th percentile.  The AC is at the 91st percentile.  ZACHARIAH 18.3.  Anterior placenta.  Cephalic presentation.    Assessment & Plan:  Reviewed today's growth ultrasound.  EFW is 5 pounds 3 ounces, 2340 g.  This is the 69th percentile.  The AC is at the 91st percentile.  ZACHARIAH 18.3.  Anterior placenta.  Cephalic presentation.      5. Tobacco use during pregnancy, antepartum  Assessment & Plan:  Smoking cessation        33w1d  Reassuring pregnancy progress    Counseling: OB precautions, leaking, VB, ashu high vs PTL/Labor  [facility]    Questions answered    Return in about 2 weeks (around 5/8/2024) for Recheck.    Drew Jennings MD  04/24/2024

## 2024-04-24 NOTE — ASSESSMENT & PLAN NOTE
Reviewed today's growth ultrasound.  EFW is 5 pounds 3 ounces, 2340 g.  This is the 69th percentile.  The AC is at the 91st percentile.  ZACHARIAH 18.3.  Anterior placenta.  Cephalic presentation.

## 2024-04-24 NOTE — ASSESSMENT & PLAN NOTE
Discussed exercise, nutrition and appropriate weight gain in pregnancy.  The patient is only gained 5 pounds thus far.

## 2024-05-15 ENCOUNTER — ROUTINE PRENATAL (OUTPATIENT)
Dept: OBSTETRICS AND GYNECOLOGY | Facility: CLINIC | Age: 29
End: 2024-05-15
Payer: COMMERCIAL

## 2024-05-15 VITALS — SYSTOLIC BLOOD PRESSURE: 123 MMHG | BODY MASS INDEX: 37.09 KG/M2 | WEIGHT: 209.4 LBS | DIASTOLIC BLOOD PRESSURE: 81 MMHG

## 2024-05-15 DIAGNOSIS — O99.330 TOBACCO USE DURING PREGNANCY, ANTEPARTUM: ICD-10-CM

## 2024-05-15 DIAGNOSIS — O99.210 OBESITY AFFECTING PREGNANCY, ANTEPARTUM, UNSPECIFIED OBESITY TYPE: ICD-10-CM

## 2024-05-15 DIAGNOSIS — O26.13 LOW WEIGHT GAIN DURING PREGNANCY IN THIRD TRIMESTER: ICD-10-CM

## 2024-05-15 DIAGNOSIS — O34.219 PREVIOUS CESAREAN DELIVERY AFFECTING PREGNANCY: ICD-10-CM

## 2024-05-15 DIAGNOSIS — Z34.80 SUPERVISION OF OTHER NORMAL PREGNANCY, ANTEPARTUM: Primary | ICD-10-CM

## 2024-05-15 LAB
GLUCOSE UR STRIP-MCNC: NEGATIVE MG/DL
PROT UR STRIP-MCNC: ABNORMAL MG/DL

## 2024-05-15 PROCEDURE — 87653 STREP B DNA AMP PROBE: CPT | Performed by: OBSTETRICS & GYNECOLOGY

## 2024-05-15 NOTE — PROGRESS NOTES
[facility]  OB Follow Up Visit    CC: Routine obstetrical visit    Prenatal care complicated by:  Patient Active Problem List   Diagnosis    Calculus of gallbladder without cholecystitis without obstruction    Previous  delivery affecting pregnancy    Supervision of other normal pregnancy, antepartum    Tobacco use during pregnancy, antepartum    Obesity affecting pregnancy, antepartum    Pain, dental    Low weight gain during pregnancy in third trimester     Subjective:   Mali Mercer is a 28 y.o.  36w1d patient being seen today for her obstetrical follow up visit. The patient has: No complaints, No leaking fluid, No vaginal bleeding, Adequate FM  Reports only occasional contractions    History: Past medical and surgical history, medications, allergies, social history, and obstetrical history all reviewed and updated.    Objective:    Urine glucose/protein - See OB flow sheet      /81   Wt 95 kg (209 lb 6.4 oz)   LMP 2023 (Approximate)   BMI 37.09 kg/m²     General exam: Comfortable, NAD  FHR: 147 BPM   Uterine Size:  36 cm  Pelvic Exam: Yes.  Presentation: cephalic. Dilation: Closed. Effacement: 80%. Station: -3.    Assessment and Plan:  Diagnoses and all orders for this visit:    1. Supervision of other normal pregnancy, antepartum (Primary)  Overview:  EDC: 2024    Prenatal genetic screening: Undecided    Previous  delivery  Gallstones  Smoker  Obesity    COVID-19 vaccine: Done  Flu vaccine: Recommended  Tdap vaccine:  RSV vaccine:    Assessment & Plan:  GBS collected  Continue prenatal vitamins  Fetal kick counts  Labor instructions    Orders:  -     POC Urinalysis Dipstick  -     Group B Strep (Molecular) - Swab, Vaginal/Rectum; Future    2. Previous  delivery affecting pregnancy  Overview:   calculator 52% chance of success  Recommend repeat  delivery  Anterior placenta    Assessment & Plan:  We have reviewed the risks,  benefits, and alternatives of the procedure including the risk of: bleeding, infection, hemorrhage, blood transfusion, risk of injury to nearby structures including: bowl, bladder, pelvic blood vessels and nerves, risk of injury to the baby, risk of anesthesia, venous thromboembolism, myocardial infarction, stroke, and death. We have also discussed the risks of repetitive  deliveries including the risk of abnormal placentation such as placenta accreta. The patient expresses her understanding of these risks and wishes to proceed.      3. Obesity affecting pregnancy, antepartum, unspecified obesity type    4. Low weight gain during pregnancy in third trimester  Overview:  2024: EFW is 5 pounds 3 ounces, 2340 g.  This is the 69th percentile.  The AC is at the 91st percentile.  ZACHARIAH 18.3.  Anterior placenta.  Cephalic presentation.      5. Tobacco use during pregnancy, antepartum  Assessment & Plan:  Recommend cessation        36w1d  Reassuring pregnancy progress    Counseling: OB precautions, leaking, VB, ashu high vs PTL/Labor  [facility]    Questions answered    Return in about 1 week (around 2024) for Recheck.    Drew Jennings MD  05/15/2024

## 2024-05-16 LAB — GROUP B STREP, DNA: POSITIVE

## 2024-05-17 ENCOUNTER — TELEPHONE (OUTPATIENT)
Dept: OBSTETRICS AND GYNECOLOGY | Facility: CLINIC | Age: 29
End: 2024-05-17
Payer: COMMERCIAL

## 2024-05-17 NOTE — TELEPHONE ENCOUNTER
[facility] Informed patient once provider reviewed we could discuss those. [phone].

## 2024-05-22 ENCOUNTER — ROUTINE PRENATAL (OUTPATIENT)
Dept: OBSTETRICS AND GYNECOLOGY | Facility: CLINIC | Age: 29
End: 2024-05-22
Payer: COMMERCIAL

## 2024-05-22 VITALS — BODY MASS INDEX: 36.6 KG/M2 | DIASTOLIC BLOOD PRESSURE: 76 MMHG | SYSTOLIC BLOOD PRESSURE: 108 MMHG | WEIGHT: 206.6 LBS

## 2024-05-22 DIAGNOSIS — O34.219 PREVIOUS CESAREAN DELIVERY AFFECTING PREGNANCY: ICD-10-CM

## 2024-05-22 DIAGNOSIS — Z34.80 SUPERVISION OF OTHER NORMAL PREGNANCY, ANTEPARTUM: Primary | ICD-10-CM

## 2024-05-22 DIAGNOSIS — O26.13 LOW WEIGHT GAIN DURING PREGNANCY IN THIRD TRIMESTER: ICD-10-CM

## 2024-05-22 DIAGNOSIS — O99.210 OBESITY AFFECTING PREGNANCY, ANTEPARTUM, UNSPECIFIED OBESITY TYPE: ICD-10-CM

## 2024-05-22 LAB
GLUCOSE UR STRIP-MCNC: NEGATIVE MG/DL
PROT UR STRIP-MCNC: NEGATIVE MG/DL

## 2024-05-22 NOTE — PROGRESS NOTES
[facility]  OB Follow Up Visit    CC: Routine obstetrical visit    Prenatal care complicated by:  Patient Active Problem List   Diagnosis    Calculus of gallbladder without cholecystitis without obstruction    Previous  delivery affecting pregnancy    Supervision of other normal pregnancy, antepartum    Tobacco use during pregnancy, antepartum    Obesity affecting pregnancy, antepartum    Pain, dental    Low weight gain during pregnancy in third trimester     Subjective:   Mali Mercer is a 28 y.o.  37w1d patient being seen today for her obstetrical follow up visit. The patient has: No complaints, No leaking fluid, No vaginal bleeding, Adequate FM  The patient reports occasional contractions.    History: Past medical and surgical history, medications, allergies, social history, and obstetrical history all reviewed and updated.    Objective:    Urine glucose/protein - See OB flow sheet      /76   Wt 93.7 kg (206 lb 9.6 oz)   LMP 2023 (Approximate)   BMI 36.60 kg/m²     General exam: Comfortable, NAD  FHR: 145 BPM   Uterine Size:  37 cm  Pelvic Exam: No    Assessment and Plan:  Diagnoses and all orders for this visit:    1. Supervision of other normal pregnancy, antepartum (Primary)  Overview:  EDC: 2024    Prenatal genetic screening: Undecided    Previous  delivery  Gallstones  Smoker  Obesity    COVID-19 vaccine: Done  Flu vaccine: Recommended  Tdap vaccine: Rx provided      Assessment & Plan:  Continue prenatal vitamins  Fetal kick counts  Labor instructions    Orders:  -     POC Urinalysis Dipstick    2. Previous  delivery affecting pregnancy  Overview:   calculator 52% chance of success  Recommend repeat  delivery  Anterior placenta    Assessment & Plan:  We have reviewed the risks, benefits, and alternatives of the procedure including the risk of: bleeding, infection, hemorrhage, blood transfusion, risk of injury to nearby  structures including: bowl, bladder, pelvic blood vessels and nerves, risk of injury to the baby, risk of anesthesia, venous thromboembolism, myocardial infarction, stroke, and death. We have also discussed the risks of repetitive  deliveries including the risk of abnormal placentation such as placenta accreta. The patient expresses her understanding of these risks and wishes to proceed.      3. Obesity affecting pregnancy, antepartum, unspecified obesity type  Assessment & Plan:  Discussed exercise, nutrition and appropriate weight gain in pregnancy      4. Low weight gain during pregnancy in third trimester  Overview:  2024: EFW is 5 pounds 3 ounces, 2340 g.  This is the 69th percentile.  The AC is at the 91st percentile.  ZACHARIAH 18.3.  Anterior placenta.  Cephalic presentation.        37w1d  Reassuring pregnancy progress    Counseling: OB precautions, leaking, VB, ashu high vs PTL/Labor  [facility]    Questions answered    Return in about 1 week (around 2024) for Recheck.    Drew Jennings MD  2024

## 2024-05-28 ENCOUNTER — ROUTINE PRENATAL (OUTPATIENT)
Dept: OBSTETRICS AND GYNECOLOGY | Facility: CLINIC | Age: 29
End: 2024-05-28
Payer: COMMERCIAL

## 2024-05-28 VITALS — WEIGHT: 209 LBS | SYSTOLIC BLOOD PRESSURE: 129 MMHG | DIASTOLIC BLOOD PRESSURE: 82 MMHG | BODY MASS INDEX: 37.02 KG/M2

## 2024-05-28 DIAGNOSIS — Z34.80 SUPERVISION OF OTHER NORMAL PREGNANCY, ANTEPARTUM: Primary | ICD-10-CM

## 2024-05-28 DIAGNOSIS — O26.13 LOW WEIGHT GAIN DURING PREGNANCY IN THIRD TRIMESTER: ICD-10-CM

## 2024-05-28 DIAGNOSIS — O99.330 TOBACCO USE DURING PREGNANCY, ANTEPARTUM: ICD-10-CM

## 2024-05-28 DIAGNOSIS — O34.219 PREVIOUS CESAREAN DELIVERY AFFECTING PREGNANCY: ICD-10-CM

## 2024-05-28 PROCEDURE — 0502F SUBSEQUENT PRENATAL CARE: CPT | Performed by: OBSTETRICS & GYNECOLOGY

## 2024-05-28 NOTE — PROGRESS NOTES
[facility]  OB Follow Up Visit    CC: Routine obstetrical visit    Prenatal care complicated by:  Patient Active Problem List   Diagnosis    Calculus of gallbladder without cholecystitis without obstruction    Previous  delivery affecting pregnancy    Supervision of other normal pregnancy, antepartum    Tobacco use during pregnancy, antepartum    Obesity affecting pregnancy, antepartum    Pain, dental    Low weight gain during pregnancy in third trimester     Subjective:   Mali Mercer is a 28 y.o.  37w6d patient being seen today for her obstetrical follow up visit. The patient has: No complaints, No leaking fluid, No vaginal bleeding, No contractions, Adequate FM    History: Past medical and surgical history, medications, allergies, social history, and obstetrical history all reviewed and updated.    Objective:    Urine glucose/protein - See OB flow sheet      /82   Wt 94.8 kg (209 lb)   LMP 2023 (Approximate)   BMI 37.02 kg/m²     General exam: Comfortable, NAD  FHR: 155 BPM   Uterine Size:  38 cm  Pelvic Exam: No    Assessment and Plan:  Diagnoses and all orders for this visit:    1. Supervision of other normal pregnancy, antepartum (Primary)  Overview:  EDC: 2024    Prenatal genetic screening: Undecided    Previous  delivery  Gallstones  Smoker  Obesity    COVID-19 vaccine: Done  Flu vaccine: Recommended  Tdap vaccine: Rx provided      Assessment & Plan:  Continue prenatal vitamins  Fetal kick counts  Labor instructions    Orders:  -     POC Urinalysis Dipstick    2. Previous  delivery affecting pregnancy  Overview:   calculator 52% chance of success  Recommend repeat  delivery  Anterior placenta    Assessment & Plan:  We have reviewed the risks, benefits, and alternatives of the procedure including the risk of: bleeding, infection, hemorrhage, blood transfusion, risk of injury to nearby structures including: bowl, bladder,  pelvic blood vessels and nerves, risk of injury to the baby, risk of anesthesia, venous thromboembolism, myocardial infarction, stroke, and death. We have also discussed the risks of repetitive  deliveries including the risk of abnormal placentation such as placenta accreta. The patient expresses her understanding of these risks and wishes to proceed.      3. Low weight gain during pregnancy in third trimester  Overview:  2024: EFW is 5 pounds 3 ounces, 2340 g.  This is the 69th percentile.  The AC is at the 91st percentile.  ZACHARIAH 18.3.  Anterior placenta.  Cephalic presentation.      4. Tobacco use during pregnancy, antepartum  Assessment & Plan:  Recommend cessation        37w6d  Reassuring pregnancy progress    Counseling: OB precautions, leaking, VB, ashu high vs PTL/Labor  [facility]    Questions answered    Return in about 6 weeks (around 2024) for postpartum check.    Drew Jennings MD  2024

## 2024-06-06 ENCOUNTER — PREP FOR SURGERY (OUTPATIENT)
Dept: OTHER | Facility: HOSPITAL | Age: 29
End: 2024-06-06
Payer: COMMERCIAL

## 2024-06-06 DIAGNOSIS — O34.219 PREVIOUS CESAREAN DELIVERY AFFECTING PREGNANCY: Primary | ICD-10-CM

## 2024-06-06 RX ORDER — CITRIC ACID/SODIUM CITRATE 334-500MG
30 SOLUTION, ORAL ORAL ONCE
Status: CANCELLED | OUTPATIENT
Start: 2024-06-06 | End: 2024-06-06

## 2024-06-06 RX ORDER — ONDANSETRON 4 MG/1
4 TABLET, ORALLY DISINTEGRATING ORAL EVERY 6 HOURS PRN
Status: CANCELLED | OUTPATIENT
Start: 2024-06-06

## 2024-06-06 RX ORDER — METOCLOPRAMIDE HYDROCHLORIDE 5 MG/ML
10 INJECTION INTRAMUSCULAR; INTRAVENOUS
Status: CANCELLED | OUTPATIENT
Start: 2024-06-07 | End: 2024-06-08

## 2024-06-06 RX ORDER — SODIUM CHLORIDE 0.9 % (FLUSH) 0.9 %
10 SYRINGE (ML) INJECTION AS NEEDED
Status: CANCELLED | OUTPATIENT
Start: 2024-06-06

## 2024-06-06 RX ORDER — ACETAMINOPHEN 500 MG
1000 TABLET ORAL ONCE
Status: CANCELLED | OUTPATIENT
Start: 2024-06-06 | End: 2024-06-06

## 2024-06-06 RX ORDER — FAMOTIDINE 20 MG/1
20 TABLET, FILM COATED ORAL ONCE AS NEEDED
Status: CANCELLED | OUTPATIENT
Start: 2024-06-06

## 2024-06-06 RX ORDER — MISOPROSTOL 200 UG/1
800 TABLET ORAL AS NEEDED
Status: CANCELLED | OUTPATIENT
Start: 2024-06-06

## 2024-06-06 RX ORDER — METHYLERGONOVINE MALEATE 0.2 MG/ML
200 INJECTION INTRAVENOUS ONCE AS NEEDED
Status: CANCELLED | OUTPATIENT
Start: 2024-06-06

## 2024-06-06 RX ORDER — SODIUM CHLORIDE, SODIUM LACTATE, POTASSIUM CHLORIDE, CALCIUM CHLORIDE 600; 310; 30; 20 MG/100ML; MG/100ML; MG/100ML; MG/100ML
150 INJECTION, SOLUTION INTRAVENOUS CONTINUOUS
Status: CANCELLED | OUTPATIENT
Start: 2024-06-06

## 2024-06-06 RX ORDER — ONDANSETRON 2 MG/ML
4 INJECTION INTRAMUSCULAR; INTRAVENOUS EVERY 6 HOURS PRN
Status: CANCELLED | OUTPATIENT
Start: 2024-06-06

## 2024-06-06 RX ORDER — FAMOTIDINE 10 MG/ML
20 INJECTION, SOLUTION INTRAVENOUS ONCE AS NEEDED
Status: CANCELLED | OUTPATIENT
Start: 2024-06-06

## 2024-06-06 RX ORDER — OXYTOCIN/0.9 % SODIUM CHLORIDE 30/500 ML
125 PLASTIC BAG, INJECTION (ML) INTRAVENOUS ONCE
Status: CANCELLED | OUTPATIENT
Start: 2024-06-06 | End: 2024-06-06

## 2024-06-06 RX ORDER — KETOROLAC TROMETHAMINE 30 MG/ML
30 INJECTION, SOLUTION INTRAMUSCULAR; INTRAVENOUS ONCE
Status: CANCELLED | OUTPATIENT
Start: 2024-06-06 | End: 2024-06-06

## 2024-06-06 RX ORDER — LIDOCAINE HYDROCHLORIDE 10 MG/ML
0.5 INJECTION, SOLUTION EPIDURAL; INFILTRATION; INTRACAUDAL; PERINEURAL ONCE AS NEEDED
Status: CANCELLED | OUTPATIENT
Start: 2024-06-06

## 2024-06-06 RX ORDER — SODIUM CHLORIDE 9 MG/ML
40 INJECTION, SOLUTION INTRAVENOUS AS NEEDED
Status: CANCELLED | OUTPATIENT
Start: 2024-06-06

## 2024-06-06 RX ORDER — SODIUM CHLORIDE 0.9 % (FLUSH) 0.9 %
10 SYRINGE (ML) INJECTION EVERY 12 HOURS SCHEDULED
Status: CANCELLED | OUTPATIENT
Start: 2024-06-06

## 2024-06-06 RX ORDER — TRANEXAMIC ACID 10 MG/ML
1000 INJECTION, SOLUTION INTRAVENOUS ONCE AS NEEDED
Status: CANCELLED | OUTPATIENT
Start: 2024-06-06

## 2024-06-06 RX ORDER — CARBOPROST TROMETHAMINE 250 UG/ML
250 INJECTION, SOLUTION INTRAMUSCULAR AS NEEDED
Status: CANCELLED | OUTPATIENT
Start: 2024-06-06

## 2024-06-07 ENCOUNTER — HOSPITAL ENCOUNTER (INPATIENT)
Facility: HOSPITAL | Age: 29
LOS: 2 days | Discharge: HOME | End: 2024-06-09
Attending: OBSTETRICS & GYNECOLOGY | Admitting: OBSTETRICS & GYNECOLOGY
Payer: COMMERCIAL

## 2024-06-07 ENCOUNTER — ANESTHESIA (OUTPATIENT)
Dept: LABOR AND DELIVERY | Facility: HOSPITAL | Age: 29
End: 2024-06-07
Payer: COMMERCIAL

## 2024-06-07 ENCOUNTER — ANESTHESIA EVENT (OUTPATIENT)
Dept: LABOR AND DELIVERY | Facility: HOSPITAL | Age: 29
End: 2024-06-07
Payer: COMMERCIAL

## 2024-06-07 DIAGNOSIS — O34.219 PREVIOUS CESAREAN DELIVERY AFFECTING PREGNANCY: ICD-10-CM

## 2024-06-07 LAB
ABO GROUP BLD: NORMAL
AMPHET+METHAMPHET UR QL: NEGATIVE
ATMOSPHERIC PRESS: 741.4 MMHG
ATMOSPHERIC PRESS: 741.7 MMHG
BARBITURATES UR QL SCN: NEGATIVE
BASE EXCESS BLDCOA CALC-SCNC: -0.5 MMOL/L (ref -2–2)
BASE EXCESS BLDCOV CALC-SCNC: -0.8 MMOL/L (ref -30–30)
BASOPHILS # BLD AUTO: 0.04 10*3/MM3 (ref 0–0.2)
BASOPHILS NFR BLD AUTO: 0.3 % (ref 0–1.5)
BENZODIAZ UR QL SCN: NEGATIVE
BLD GP AB SCN SERPL QL: NEGATIVE
CANNABINOIDS SERPL QL: NEGATIVE
COCAINE UR QL: NEGATIVE
DEPRECATED RDW RBC AUTO: 42.4 FL (ref 37–54)
DEVICE COMMENT: NORMAL
EOSINOPHIL # BLD AUTO: 0.14 10*3/MM3 (ref 0–0.4)
EOSINOPHIL NFR BLD AUTO: 1 % (ref 0.3–6.2)
ERYTHROCYTE [DISTWIDTH] IN BLOOD BY AUTOMATED COUNT: 13.8 % (ref 12.3–15.4)
FENTANYL UR-MCNC: NEGATIVE NG/ML
HCO3 BLDCOA-SCNC: 26.8 MMOL/L
HCO3 BLDCOV-SCNC: 25.1 MMOL/L
HCT VFR BLD AUTO: 33.6 % (ref 34–46.6)
HGB BLD-MCNC: 11.2 G/DL (ref 12–15.9)
IMM GRANULOCYTES # BLD AUTO: 0.1 10*3/MM3 (ref 0–0.05)
IMM GRANULOCYTES NFR BLD AUTO: 0.7 % (ref 0–0.5)
LYMPHOCYTES # BLD AUTO: 2.51 10*3/MM3 (ref 0.7–3.1)
LYMPHOCYTES NFR BLD AUTO: 17.4 % (ref 19.6–45.3)
MCH RBC QN AUTO: 28.1 PG (ref 26.6–33)
MCHC RBC AUTO-ENTMCNC: 33.3 G/DL (ref 31.5–35.7)
MCV RBC AUTO: 84.2 FL (ref 79–97)
METHADONE UR QL SCN: NEGATIVE
MONOCYTES # BLD AUTO: 1 10*3/MM3 (ref 0.1–0.9)
MONOCYTES NFR BLD AUTO: 6.9 % (ref 5–12)
NEUTROPHILS NFR BLD AUTO: 10.6 10*3/MM3 (ref 1.7–7)
NEUTROPHILS NFR BLD AUTO: 73.7 % (ref 42.7–76)
NRBC BLD AUTO-RTO: 0 /100 WBC (ref 0–0.2)
OPIATES UR QL: NEGATIVE
OXYCODONE UR QL SCN: NEGATIVE
PCO2 BLDCOA: 53.8 MMHG (ref 33–49)
PCO2 BLDCOV: 45.1 MM HG (ref 35–51.3)
PH BLDCOA: 7.31 PH UNITS (ref 7.18–7.34)
PH BLDCOV: 7.35 PH UNITS (ref 7.26–7.4)
PLATELET # BLD AUTO: 284 10*3/MM3 (ref 140–450)
PMV BLD AUTO: 9.4 FL (ref 6–12)
PO2 BLDCOA: 10.3 MMHG
PO2 BLDCOV: 19.6 MM HG (ref 19–39)
RBC # BLD AUTO: 3.99 10*6/MM3 (ref 3.77–5.28)
RH BLD: POSITIVE
SAO2 % BLDCOA: 8.5 %
SAO2 % BLDCOV: 28.2 %
T PALLIDUM IGG SER QL: NORMAL
T&S EXPIRATION DATE: NORMAL
WBC NRBC COR # BLD AUTO: 14.39 10*3/MM3 (ref 3.4–10.8)

## 2024-06-07 PROCEDURE — 25810000003 LACTATED RINGERS PER 1000 ML: Performed by: OBSTETRICS & GYNECOLOGY

## 2024-06-07 PROCEDURE — 86780 TREPONEMA PALLIDUM: CPT | Performed by: OBSTETRICS & GYNECOLOGY

## 2024-06-07 PROCEDURE — 82803 BLOOD GASES ANY COMBINATION: CPT

## 2024-06-07 PROCEDURE — 86900 BLOOD TYPING SEROLOGIC ABO: CPT | Performed by: OBSTETRICS & GYNECOLOGY

## 2024-06-07 PROCEDURE — 80307 DRUG TEST PRSMV CHEM ANLYZR: CPT | Performed by: OBSTETRICS & GYNECOLOGY

## 2024-06-07 PROCEDURE — 85025 COMPLETE CBC W/AUTO DIFF WBC: CPT | Performed by: OBSTETRICS & GYNECOLOGY

## 2024-06-07 PROCEDURE — 25010000002 OXYTOCIN PER 10 UNITS: Performed by: NURSE ANESTHETIST, CERTIFIED REGISTERED

## 2024-06-07 PROCEDURE — 25010000002 KETOROLAC TROMETHAMINE PER 15 MG: Performed by: OBSTETRICS & GYNECOLOGY

## 2024-06-07 PROCEDURE — 86850 RBC ANTIBODY SCREEN: CPT | Performed by: OBSTETRICS & GYNECOLOGY

## 2024-06-07 PROCEDURE — 59510 CESAREAN DELIVERY: CPT | Performed by: OBSTETRICS & GYNECOLOGY

## 2024-06-07 PROCEDURE — 86901 BLOOD TYPING SEROLOGIC RH(D): CPT | Performed by: OBSTETRICS & GYNECOLOGY

## 2024-06-07 PROCEDURE — 25810000003 LACTATED RINGERS SOLUTION: Performed by: OBSTETRICS & GYNECOLOGY

## 2024-06-07 PROCEDURE — 25010000002 ONDANSETRON PER 1 MG: Performed by: OBSTETRICS & GYNECOLOGY

## 2024-06-07 PROCEDURE — 25010000002 CEFAZOLIN PER 500 MG: Performed by: OBSTETRICS & GYNECOLOGY

## 2024-06-07 PROCEDURE — 25010000002 BUPIVACAINE PF 0.75 % SOLUTION: Performed by: NURSE ANESTHETIST, CERTIFIED REGISTERED

## 2024-06-07 PROCEDURE — 25010000002 MORPHINE PER 10 MG: Performed by: NURSE ANESTHETIST, CERTIFIED REGISTERED

## 2024-06-07 RX ORDER — FAMOTIDINE 20 MG/1
20 TABLET, FILM COATED ORAL ONCE AS NEEDED
Status: DISCONTINUED | OUTPATIENT
Start: 2024-06-07 | End: 2024-06-07 | Stop reason: HOSPADM

## 2024-06-07 RX ORDER — CARBOPROST TROMETHAMINE 250 UG/ML
250 INJECTION, SOLUTION INTRAMUSCULAR AS NEEDED
Status: DISCONTINUED | OUTPATIENT
Start: 2024-06-07 | End: 2024-06-07 | Stop reason: HOSPADM

## 2024-06-07 RX ORDER — ONDANSETRON 2 MG/ML
4 INJECTION INTRAMUSCULAR; INTRAVENOUS EVERY 6 HOURS PRN
Status: DISCONTINUED | OUTPATIENT
Start: 2024-06-07 | End: 2024-06-07

## 2024-06-07 RX ORDER — EPHEDRINE SULFATE 50 MG/ML
INJECTION INTRAVENOUS AS NEEDED
Status: DISCONTINUED | OUTPATIENT
Start: 2024-06-07 | End: 2024-06-07 | Stop reason: SURG

## 2024-06-07 RX ORDER — DIPHENHYDRAMINE HYDROCHLORIDE 50 MG/ML
12.5 INJECTION INTRAMUSCULAR; INTRAVENOUS EVERY 6 HOURS PRN
Status: ACTIVE | OUTPATIENT
Start: 2024-06-07 | End: 2024-06-08

## 2024-06-07 RX ORDER — ONDANSETRON 2 MG/ML
4 INJECTION INTRAMUSCULAR; INTRAVENOUS EVERY 6 HOURS PRN
Status: DISCONTINUED | OUTPATIENT
Start: 2024-06-07 | End: 2024-06-07 | Stop reason: HOSPADM

## 2024-06-07 RX ORDER — OXYCODONE HYDROCHLORIDE 5 MG/1
10 TABLET ORAL EVERY 4 HOURS PRN
Status: DISCONTINUED | OUTPATIENT
Start: 2024-06-07 | End: 2024-06-09 | Stop reason: HOSPADM

## 2024-06-07 RX ORDER — ONDANSETRON 4 MG/1
4 TABLET, ORALLY DISINTEGRATING ORAL EVERY 6 HOURS PRN
Status: DISCONTINUED | OUTPATIENT
Start: 2024-06-07 | End: 2024-06-07 | Stop reason: HOSPADM

## 2024-06-07 RX ORDER — FAMOTIDINE 10 MG/ML
20 INJECTION, SOLUTION INTRAVENOUS ONCE AS NEEDED
Status: COMPLETED | OUTPATIENT
Start: 2024-06-07 | End: 2024-06-07

## 2024-06-07 RX ORDER — IBUPROFEN 600 MG/1
600 TABLET ORAL EVERY 6 HOURS PRN
Qty: 60 TABLET | Refills: 1 | Status: SHIPPED | OUTPATIENT
Start: 2024-06-07

## 2024-06-07 RX ORDER — BUPIVACAINE HYDROCHLORIDE 7.5 MG/ML
INJECTION, SOLUTION EPIDURAL; RETROBULBAR
Status: COMPLETED | OUTPATIENT
Start: 2024-06-07 | End: 2024-06-07

## 2024-06-07 RX ORDER — BISACODYL 10 MG
10 SUPPOSITORY, RECTAL RECTAL DAILY PRN
Status: DISCONTINUED | OUTPATIENT
Start: 2024-06-07 | End: 2024-06-09 | Stop reason: HOSPADM

## 2024-06-07 RX ORDER — PHENYLEPHRINE HCL IN 0.9% NACL 1 MG/10 ML
SYRINGE (ML) INTRAVENOUS AS NEEDED
Status: DISCONTINUED | OUTPATIENT
Start: 2024-06-07 | End: 2024-06-07 | Stop reason: SURG

## 2024-06-07 RX ORDER — SODIUM CHLORIDE 0.9 % (FLUSH) 0.9 %
10 SYRINGE (ML) INJECTION AS NEEDED
Status: DISCONTINUED | OUTPATIENT
Start: 2024-06-07 | End: 2024-06-07 | Stop reason: HOSPADM

## 2024-06-07 RX ORDER — OXYCODONE HYDROCHLORIDE 5 MG/1
5 TABLET ORAL EVERY 4 HOURS PRN
Status: DISCONTINUED | OUTPATIENT
Start: 2024-06-07 | End: 2024-06-09 | Stop reason: HOSPADM

## 2024-06-07 RX ORDER — SODIUM CHLORIDE, SODIUM LACTATE, POTASSIUM CHLORIDE, CALCIUM CHLORIDE 600; 310; 30; 20 MG/100ML; MG/100ML; MG/100ML; MG/100ML
150 INJECTION, SOLUTION INTRAVENOUS CONTINUOUS
Status: DISCONTINUED | OUTPATIENT
Start: 2024-06-07 | End: 2024-06-07

## 2024-06-07 RX ORDER — DIPHENHYDRAMINE HCL 25 MG
25 CAPSULE ORAL EVERY 6 HOURS PRN
Status: ACTIVE | OUTPATIENT
Start: 2024-06-07 | End: 2024-06-08

## 2024-06-07 RX ORDER — ACETAMINOPHEN 500 MG
1000 TABLET ORAL EVERY 6 HOURS
Status: DISPENSED | OUTPATIENT
Start: 2024-06-07 | End: 2024-06-08

## 2024-06-07 RX ORDER — ONDANSETRON 2 MG/ML
4 INJECTION INTRAMUSCULAR; INTRAVENOUS EVERY 6 HOURS PRN
Status: DISCONTINUED | OUTPATIENT
Start: 2024-06-07 | End: 2024-06-09 | Stop reason: HOSPADM

## 2024-06-07 RX ORDER — PRENATAL VIT/IRON FUM/FOLIC AC 27MG-0.8MG
1 TABLET ORAL DAILY
Status: DISCONTINUED | OUTPATIENT
Start: 2024-06-07 | End: 2024-06-09 | Stop reason: HOSPADM

## 2024-06-07 RX ORDER — MISOPROSTOL 200 UG/1
800 TABLET ORAL AS NEEDED
Status: DISCONTINUED | OUTPATIENT
Start: 2024-06-07 | End: 2024-06-07 | Stop reason: HOSPADM

## 2024-06-07 RX ORDER — METHYLERGONOVINE MALEATE 0.2 MG/ML
INJECTION INTRAVENOUS
Status: DISCONTINUED
Start: 2024-06-07 | End: 2024-06-07 | Stop reason: WASHOUT

## 2024-06-07 RX ORDER — METHYLERGONOVINE MALEATE 0.2 MG/ML
200 INJECTION INTRAVENOUS ONCE AS NEEDED
Status: DISCONTINUED | OUTPATIENT
Start: 2024-06-07 | End: 2024-06-07 | Stop reason: HOSPADM

## 2024-06-07 RX ORDER — SODIUM CHLORIDE 9 MG/ML
40 INJECTION, SOLUTION INTRAVENOUS AS NEEDED
Status: DISCONTINUED | OUTPATIENT
Start: 2024-06-07 | End: 2024-06-07 | Stop reason: HOSPADM

## 2024-06-07 RX ORDER — ACETAMINOPHEN 325 MG/1
650 TABLET ORAL EVERY 6 HOURS
Status: DISCONTINUED | OUTPATIENT
Start: 2024-06-08 | End: 2024-06-09 | Stop reason: HOSPADM

## 2024-06-07 RX ORDER — LIDOCAINE HYDROCHLORIDE 10 MG/ML
0.5 INJECTION, SOLUTION EPIDURAL; INFILTRATION; INTRACAUDAL; PERINEURAL ONCE AS NEEDED
Status: DISCONTINUED | OUTPATIENT
Start: 2024-06-07 | End: 2024-06-07 | Stop reason: HOSPADM

## 2024-06-07 RX ORDER — ALUMINA, MAGNESIA, AND SIMETHICONE 2400; 2400; 240 MG/30ML; MG/30ML; MG/30ML
15 SUSPENSION ORAL EVERY 4 HOURS PRN
Status: DISCONTINUED | OUTPATIENT
Start: 2024-06-07 | End: 2024-06-09 | Stop reason: HOSPADM

## 2024-06-07 RX ORDER — IBUPROFEN 600 MG/1
600 TABLET ORAL EVERY 6 HOURS
Status: DISCONTINUED | OUTPATIENT
Start: 2024-06-08 | End: 2024-06-09 | Stop reason: HOSPADM

## 2024-06-07 RX ORDER — DOCUSATE SODIUM 100 MG/1
100 CAPSULE, LIQUID FILLED ORAL 2 TIMES DAILY
Qty: 60 CAPSULE | Refills: 1 | Status: SHIPPED | OUTPATIENT
Start: 2024-06-07

## 2024-06-07 RX ORDER — MISOPROSTOL 200 UG/1
TABLET ORAL
Status: DISCONTINUED
Start: 2024-06-07 | End: 2024-06-07 | Stop reason: WASHOUT

## 2024-06-07 RX ORDER — MORPHINE SULFATE 0.5 MG/ML
INJECTION, SOLUTION EPIDURAL; INTRATHECAL; INTRAVENOUS
Status: COMPLETED | OUTPATIENT
Start: 2024-06-07 | End: 2024-06-07

## 2024-06-07 RX ORDER — SODIUM CHLORIDE 0.9 % (FLUSH) 0.9 %
10 SYRINGE (ML) INJECTION EVERY 12 HOURS SCHEDULED
Status: DISCONTINUED | OUTPATIENT
Start: 2024-06-07 | End: 2024-06-07 | Stop reason: HOSPADM

## 2024-06-07 RX ORDER — CITRIC ACID/SODIUM CITRATE 334-500MG
30 SOLUTION, ORAL ORAL ONCE
Status: DISCONTINUED | OUTPATIENT
Start: 2024-06-07 | End: 2024-06-07

## 2024-06-07 RX ORDER — HYDROCODONE BITARTRATE AND ACETAMINOPHEN 5; 325 MG/1; MG/1
1-2 TABLET ORAL EVERY 6 HOURS PRN
Qty: 18 TABLET | Refills: 0 | Status: SHIPPED | OUTPATIENT
Start: 2024-06-07

## 2024-06-07 RX ORDER — KETOROLAC TROMETHAMINE 15 MG/ML
15 INJECTION, SOLUTION INTRAMUSCULAR; INTRAVENOUS EVERY 6 HOURS
Status: COMPLETED | OUTPATIENT
Start: 2024-06-07 | End: 2024-06-08

## 2024-06-07 RX ORDER — OXYTOCIN/0.9 % SODIUM CHLORIDE 30/500 ML
125 PLASTIC BAG, INJECTION (ML) INTRAVENOUS ONCE
Status: COMPLETED | OUTPATIENT
Start: 2024-06-07 | End: 2024-06-07

## 2024-06-07 RX ORDER — DOCUSATE SODIUM 100 MG/1
100 CAPSULE, LIQUID FILLED ORAL 2 TIMES DAILY
Status: DISCONTINUED | OUTPATIENT
Start: 2024-06-07 | End: 2024-06-09 | Stop reason: HOSPADM

## 2024-06-07 RX ORDER — OXYTOCIN/0.9 % SODIUM CHLORIDE 30/500 ML
125 PLASTIC BAG, INJECTION (ML) INTRAVENOUS ONCE AS NEEDED
Status: DISCONTINUED | OUTPATIENT
Start: 2024-06-07 | End: 2024-06-09 | Stop reason: HOSPADM

## 2024-06-07 RX ORDER — TRANEXAMIC ACID 10 MG/ML
1000 INJECTION, SOLUTION INTRAVENOUS ONCE AS NEEDED
Status: DISCONTINUED | OUTPATIENT
Start: 2024-06-07 | End: 2024-06-07 | Stop reason: HOSPADM

## 2024-06-07 RX ORDER — FENTANYL CITRATE 50 UG/ML
50 INJECTION, SOLUTION INTRAMUSCULAR; INTRAVENOUS
Status: ACTIVE | OUTPATIENT
Start: 2024-06-07 | End: 2024-06-08

## 2024-06-07 RX ORDER — ENOXAPARIN SODIUM 100 MG/ML
40 INJECTION SUBCUTANEOUS NIGHTLY
Status: DISCONTINUED | OUTPATIENT
Start: 2024-06-08 | End: 2024-06-07

## 2024-06-07 RX ORDER — FAMOTIDINE 10 MG/ML
20 INJECTION, SOLUTION INTRAVENOUS ONCE AS NEEDED
Status: DISCONTINUED | OUTPATIENT
Start: 2024-06-07 | End: 2024-06-07 | Stop reason: HOSPADM

## 2024-06-07 RX ORDER — ENOXAPARIN SODIUM 100 MG/ML
40 INJECTION SUBCUTANEOUS EVERY 24 HOURS
Status: DISCONTINUED | OUTPATIENT
Start: 2024-06-08 | End: 2024-06-09 | Stop reason: HOSPADM

## 2024-06-07 RX ORDER — ACETAMINOPHEN 500 MG
1000 TABLET ORAL ONCE
Status: COMPLETED | OUTPATIENT
Start: 2024-06-07 | End: 2024-06-07

## 2024-06-07 RX ORDER — NALOXONE HCL 0.4 MG/ML
0.4 VIAL (ML) INJECTION ONCE AS NEEDED
Status: ACTIVE | OUTPATIENT
Start: 2024-06-07 | End: 2024-06-08

## 2024-06-07 RX ORDER — OXYTOCIN 10 [USP'U]/ML
INJECTION, SOLUTION INTRAMUSCULAR; INTRAVENOUS AS NEEDED
Status: DISCONTINUED | OUTPATIENT
Start: 2024-06-07 | End: 2024-06-07 | Stop reason: SURG

## 2024-06-07 RX ORDER — KETOROLAC TROMETHAMINE 30 MG/ML
30 INJECTION, SOLUTION INTRAMUSCULAR; INTRAVENOUS ONCE
Status: COMPLETED | OUTPATIENT
Start: 2024-06-07 | End: 2024-06-07

## 2024-06-07 RX ORDER — METOCLOPRAMIDE HYDROCHLORIDE 5 MG/ML
10 INJECTION INTRAMUSCULAR; INTRAVENOUS
Status: DISCONTINUED | OUTPATIENT
Start: 2024-06-08 | End: 2024-06-07 | Stop reason: HOSPADM

## 2024-06-07 RX ORDER — HYDROMORPHONE HYDROCHLORIDE 2 MG/ML
0.5 INJECTION, SOLUTION INTRAMUSCULAR; INTRAVENOUS; SUBCUTANEOUS
Status: DISCONTINUED | OUTPATIENT
Start: 2024-06-07 | End: 2024-06-09 | Stop reason: HOSPADM

## 2024-06-07 RX ORDER — CALCIUM CARBONATE 500 MG/1
1 TABLET, CHEWABLE ORAL EVERY 4 HOURS PRN
Status: DISCONTINUED | OUTPATIENT
Start: 2024-06-07 | End: 2024-06-09 | Stop reason: HOSPADM

## 2024-06-07 RX ADMIN — SODIUM CHLORIDE, POTASSIUM CHLORIDE, SODIUM LACTATE AND CALCIUM CHLORIDE 150 ML/HR: 600; 310; 30; 20 INJECTION, SOLUTION INTRAVENOUS at 10:27

## 2024-06-07 RX ADMIN — FAMOTIDINE 20 MG: 10 INJECTION INTRAVENOUS at 07:20

## 2024-06-07 RX ADMIN — MORPHINE SULFATE 0.2 MG: 0.5 INJECTION, SOLUTION EPIDURAL; INTRATHECAL; INTRAVENOUS at 07:38

## 2024-06-07 RX ADMIN — Medication 200 MCG: at 07:40

## 2024-06-07 RX ADMIN — BUPIVACAINE HYDROCHLORIDE 1.6 ML: 7.5 INJECTION, SOLUTION EPIDURAL; RETROBULBAR at 07:38

## 2024-06-07 RX ADMIN — SODIUM CHLORIDE, POTASSIUM CHLORIDE, SODIUM LACTATE AND CALCIUM CHLORIDE 1000 ML: 600; 310; 30; 20 INJECTION, SOLUTION INTRAVENOUS at 06:16

## 2024-06-07 RX ADMIN — Medication 125 ML/HR: at 09:49

## 2024-06-07 RX ADMIN — Medication 200 MCG: at 07:45

## 2024-06-07 RX ADMIN — EPHEDRINE SULFATE 5 MG: 50 INJECTION INTRAVENOUS at 08:03

## 2024-06-07 RX ADMIN — Medication 200 MCG: at 07:48

## 2024-06-07 RX ADMIN — ACETAMINOPHEN 1000 MG: 500 TABLET ORAL at 20:24

## 2024-06-07 RX ADMIN — ACETAMINOPHEN 1000 MG: 500 TABLET ORAL at 07:20

## 2024-06-07 RX ADMIN — Medication 200 MCG: at 08:07

## 2024-06-07 RX ADMIN — OXYTOCIN 40 UNITS: 10 INJECTION, SOLUTION INTRAMUSCULAR; INTRAVENOUS at 07:58

## 2024-06-07 RX ADMIN — ONDANSETRON 4 MG: 2 INJECTION INTRAMUSCULAR; INTRAVENOUS at 13:39

## 2024-06-07 RX ADMIN — SODIUM CHLORIDE, POTASSIUM CHLORIDE, SODIUM LACTATE AND CALCIUM CHLORIDE: 600; 310; 30; 20 INJECTION, SOLUTION INTRAVENOUS at 07:29

## 2024-06-07 RX ADMIN — KETOROLAC TROMETHAMINE 15 MG: 15 INJECTION, SOLUTION INTRAMUSCULAR; INTRAVENOUS at 22:57

## 2024-06-07 RX ADMIN — Medication 200 MCG: at 07:43

## 2024-06-07 RX ADMIN — Medication 200 MCG: at 07:52

## 2024-06-07 RX ADMIN — KETOROLAC TROMETHAMINE 30 MG: 30 INJECTION, SOLUTION INTRAMUSCULAR; INTRAVENOUS at 10:24

## 2024-06-07 RX ADMIN — EPHEDRINE SULFATE 5 MG: 50 INJECTION INTRAVENOUS at 07:55

## 2024-06-07 RX ADMIN — KETOROLAC TROMETHAMINE 15 MG: 15 INJECTION, SOLUTION INTRAMUSCULAR; INTRAVENOUS at 16:35

## 2024-06-07 RX ADMIN — DOCUSATE SODIUM 100 MG: 100 CAPSULE, LIQUID FILLED ORAL at 20:24

## 2024-06-07 RX ADMIN — SODIUM CHLORIDE 2 G: 9 INJECTION, SOLUTION INTRAVENOUS at 07:20

## 2024-06-07 NOTE — NURSING NOTE
, repeat C/s. Presented to unit for scheduled reeat C/S. Admitted to recovery 1 to prep for surgery. Accompanied by spouse.States has had occasional contractions. No bleeding or leaking, fetal movement positive. Surgery prep started.

## 2024-06-07 NOTE — H&P
[facility]  Obstetric History and Physical    Patient Name: Mali Mercer  : 1995  MRN: [id number]  Primary Care Physician:  Kellen Casanova MD  Date of admission: 2024    Chief Complaint:  Scheduled CS    Subjective     Subjective:  The patient is a 28 y.o.  currently at 39w3d, who presents for a scheduled  delivery. She has no complaints. She denies vaginal bleeding, loss of fluid or decreased fetal movement.    Her prenatal care is complicated by: Elevated BMI- pre-pregnancy, Prior       Personal History     Prenatal Information:  Prenatal Results       Initial Prenatal Labs       Test Value Reference Range Date Time    Hemoglobin  12.6 g/dL 12.0 - 15.9 23 1110    Hematocrit  36.5 % 34.0 - 46.6 23 1110    Platelets  264 10*3/mm3 140 - 450 23 1110    Rubella IgG  5.70 index Immune >0.99 23 1110    Hepatitis B SAg  Non-Reactive  Non-Reactive 23 1110    Hepatitis C Ab  Non-Reactive  Non-Reactive 23 1110    RPR        T. Pallidum Ab   Non-Reactive  Non-Reactive 23 1110    ABO  A   23 1110    Rh  Positive   23 1110    Antibody Screen  Negative   23 1110    HIV  Non-Reactive  Non-Reactive 23 1110    Urine Culture  No growth   23 1051    Gonorrhea  Negative  Negative 23 1051    Chlamydia  Negative  Negative 23 1051    TSH        HgB A1c         Varicella IgG        HgB Electrophoresis         Hemoglobinopathy (genetic testing)        Cystic fibrosis                   Fetal testing        Test Value Reference Range Date Time    NIPT        MSAFP        AFP-4                  2nd and 3rd Trimester       Test Value Reference Range Date Time    Hemoglobin (repeated)  10.6 g/dL 12.0 - 15.9 24 1153    Hematocrit (repeated)  31.8 % 34.0 - 46.6 24 1153    Platelets   269 10*3/mm3 140 - 450 24 1153       264 10*3/mm3 140 - 450 23 1110    1 hour GTT   185 mg/dL 74 -  180 03/08/24 1032       146 mg/dL 65 - 139 02/26/24 1153    Antibody Screen (repeated)        3rd TM syphilis scrn (repeated)  RPR         3rd TM syphilis scrn (repeated) FTA        GTT Fasting        GTT 1 Hr        GTT 2 Hr        GTT 3 Hr  63 mg/dL 74 - 140 03/08/24 1226    Group B Strep  Positive  Negative 05/15/24 1103              Other testing        Test Value Reference Range Date Time    Parvo IgG         CMV IgG                   Drug Screening       Test Value Reference Range Date Time    Amphetamine Screen        Barbiturate Screen        Benzodiazepine Screen        Methadone Screen        Phencyclidine Screen        Opiates Screen        THC Screen        Cocaine Screen        Propoxyphene Screen        Buprenorphine Screen        Methamphetamine Screen        Oxycodone Screen        Tricyclic Antidepressants Screen                  Legend    ^: Historical                          External Prenatal Results       Pregnancy Outside Results - Transcribed From Office Records - See Scanned Records For Details       Test Value Date Time    ABO  A  11/07/23 1110    Rh  Positive  11/07/23 1110    Antibody Screen  Negative  11/07/23 1110    Varicella IgG       Rubella  5.70 index 11/07/23 1110    Hgb  10.6 g/dL 02/26/24 1153       12.6 g/dL 11/07/23 1110    Hct  31.8 % 02/26/24 1153       36.5 % 11/07/23 1110    HgB A1c        1h GTT  185 mg/dL 03/08/24 1032       146 mg/dL 02/26/24 1153    3h GTT Fasting       3h GTT 1 hour       3h GTT 2 hour       3h GTT 3 hour   63 mg/dL 03/08/24 1226    Gonorrhea (discrete)  Negative  11/07/23 1051    Chlamydia (discrete)  Negative  11/07/23 1051    RPR       Syphilis Antibody       HBsAg  Non-Reactive  11/07/23 1110    Herpes Simplex Virus PCR       Herpes Simplex VIrus Culture       HIV  Non-Reactive  11/07/23 1110    Hep C RNA Quant PCR       Hep C Antibody  Non-Reactive  11/07/23 1110    AFP       NIPT       Cystic Fibroisis        Group B Strep  Positive  05/15/24  1103    GBS Susceptibility to Clindamycin       GBS Susceptibility to Erythromycin       Fetal Fibronectin       Genetic Testing, Maternal Blood                 Drug Screening       Test Value Date Time    Urine Drug Screen       Amphetamine Screen       Barbiturate Screen       Benzodiazepine Screen       Methadone Screen       Phencyclidine Screen       Opiates Screen       THC Screen       Cocaine Screen       Propoxyphene Screen       Buprenorphine Screen       Methamphetamine Screen       Oxycodone Screen       Tricyclic Antidepressants Screen                 Legend    ^: Historical                          OB History    Para Term  AB Living   2 1 1 0 0 1   SAB IAB Ectopic Molar Multiple Live Births   0 0 0 0 0 1      # Outcome Date GA Lbr Ramy/2nd Weight Sex Type Anes PTL Lv   2 Current            1 Term 17 40w0d  3799 g (8 lb 6 oz) M CS-Unspec   ALEXYS     Past Medical History:   Diagnosis Date    Anemia     Umbilical hernia     Urinary tract infection     Isabel had several utis     Past Surgical History:   Procedure Laterality Date    ANKLE SURGERY Left     x3- reconstruction plates/screws     SECTION      FRACTURE SURGERY  2011    TONGUE SURGERY      resconstruction    WISDOM TOOTH EXTRACTION       Family History   Problem Relation Age of Onset    Heart disease Father     Anxiety disorder Father     Diabetes Maternal Grandmother     Other Maternal Grandmother         She has had galstones in the past    Diabetes Maternal Grandfather     Breast cancer Neg Hx     Ovarian cancer Neg Hx     Uterine cancer Neg Hx     Prostate cancer Neg Hx     Colon cancer Neg Hx     Cervical cancer Neg Hx     Stomach cancer Neg Hx     Skin cancer Neg Hx     Clotting disorder Neg Hx     Malig Hyperthermia Neg Hx      Social History:   reports that she has been smoking cigarettes. She started smoking about 14 years ago. She has a 9.7 pack-year smoking history. She has been exposed to tobacco smoke. She  has never used smokeless tobacco. She reports that she does not currently use alcohol. She reports that she does not use drugs.    Medications:  ferrous sulfate and prenatal vitamin    Allergies:  Allergies   Allergen Reactions    Sulfa Antibiotics Rash       Review of Systems:  No leaking fluid, No vaginal bleeding, Is feeling adequate FM, No HA, No scotomata or vision changes, No RUQ/epigastric pain, No fever/chills, No nausea, No vomiting, No diarrhea, No constipation, Contractions, and Swelling    Objective     Vital Signs:  Temp:  [98.1 °F (36.7 °C)] 98.1 °F (36.7 °C)  Heart Rate:  [94] 94  Resp:  [18] 18  BP: (118)/(75) 118/75    Physical Exam:  General:  alert, well appearing, in no apparent distress  HEENT: PERRLA, extra ocular movement intact, sclera clear, anicteric, and neck supple with midline trachea  Cardiovascular: normal rate, regular rhythm,  no murmurs, rubs, or gallops  Lungs: clear to auscultation, no wheezes, rales or rhonchi, symmetric air entry  Abdomen: soft, nontender, nondistended, no abnormal masses, no epigastric pain, fundus soft, nontender 40 weeks size, and estimated fetal weight: 8-8.5 lbs  Extremities: 1+ edema, no redness or tenderness in the calves or thighs 2+ bilaterally    Fetal Presentation: cephalic    Labs:   Lab Results (last 24 hours)       Procedure Component Value Units Date/Time    T Pallidum Antibody w/ reflex RPR (Syphilis) [[id number]]  (Normal) Collected: 06/07/24 0616    Specimen: Blood Updated: 06/07/24 0654     Treponemal AB Total Non-Reactive    CBC & Differential [[id number]]  (Abnormal) Collected: 06/07/24 0616    Specimen: Blood Updated: 06/07/24 0629    Narrative:      The following orders were created for panel order CBC & Differential.  Procedure                               Abnormality         Status                     ---------                               -----------         ------                     CBC Auto Differential[[id number]]        Abnormal             Final result                 Please view results for these tests on the individual orders.    Urine Drug Screen - Urine, Clean Catch [[id number]]  (Normal) Collected: 06/07/24 0616    Specimen: Urine, Clean Catch Updated: 06/07/24 0716     Amphet/Methamphet, Screen Negative     Barbiturates Screen, Urine Negative     Benzodiazepine Screen, Urine Negative     Cocaine Screen, Urine Negative     Opiate Screen Negative     THC, Screen, Urine Negative     Methadone Screen, Urine Negative     Oxycodone Screen, Urine Negative     Fentanyl, Urine Negative    Narrative:      Negative Thresholds Per Drugs Screened:    Amphetamines                 500 ng/ml  Barbiturates                 200 ng/ml  Benzodiazepines              100 ng/ml  Cocaine                      300 ng/ml  Methadone                    300 ng/ml  Opiates                      300 ng/ml  Oxycodone                    100 ng/ml  THC                           50 ng/ml  Fentanyl                       5 ng/ml      The Normal Value for all drugs tested is negative. This report includes final unconfirmed screening results to be used for medical treatment purposes only. Unconfirmed results must not be used for non-medical purposes such as employment or legal testing. Clinical consideration should be applied to any drug of abuse test, particularly when unconfirmed results are used.            CBC Auto Differential [[id number]]  (Abnormal) Collected: 06/07/24 0616    Specimen: Blood Updated: 06/07/24 0629     WBC 14.39 10*3/mm3      RBC 3.99 10*6/mm3      Hemoglobin 11.2 g/dL      Hematocrit 33.6 %      MCV 84.2 fL      MCH 28.1 pg      MCHC 33.3 g/dL      RDW 13.8 %      RDW-SD 42.4 fl      MPV 9.4 fL      Platelets 284 10*3/mm3      Neutrophil % 73.7 %      Lymphocyte % 17.4 %      Monocyte % 6.9 %      Eosinophil % 1.0 %      Basophil % 0.3 %      Immature Grans % 0.7 %      Neutrophils, Absolute 10.60 10*3/mm3      Lymphocytes, Absolute 2.51 10*3/mm3       Monocytes, Absolute 1.00 10*3/mm3      Eosinophils, Absolute 0.14 10*3/mm3      Basophils, Absolute 0.04 10*3/mm3      Immature Grans, Absolute 0.10 10*3/mm3      nRBC 0.0 /100 WBC              Assessment / Plan     Assessment:  28 y.o.  currently at 39w3d    Previous  delivery affecting pregnancy    Tobacco use during pregnancy, antepartum    Obesity affecting pregnancy, antepartum    GBS: Positive    Plan:  The patient desires to proceed with a repeat  delivery.  We have reviewed the risks, benefits, and alternatives of the procedure including the risk of: bleeding, infection, hemorrhage, blood transfusion, risk of injury to nearby structures including: bowl, bladder, pelvic blood vessels and nerves, risk of injury to the baby, risk of anesthesia, venous thromboembolism, myocardial infarction, stroke, and death. We have also discussed the risks of repetitive  deliveries including the risk of abnormal placentation such as placenta accreta. The patient expresses her understanding of these risks and wishes to proceed.  Plan of care has been reviewed with patient  Risks, benefits of treatment plan have been discussed.  All questions have been answered.    Electronically signed by Drew Jennings MD, 24, 10:42 PM EDT.

## 2024-06-07 NOTE — ANESTHESIA PREPROCEDURE EVALUATION
Anesthesia Evaluation     no history of anesthetic complications:   NPO Solid Status: > 8 hours  NPO Liquid Status: > 2 hours           Airway   Mallampati: II  TM distance: >3 FB  Neck ROM: full  No difficulty expected  Dental - normal exam     Pulmonary - normal exam   (+) a smoker Current,  Cardiovascular - normal exam        Neuro/Psych  GI/Hepatic/Renal/Endo    (+) morbid obesity    Musculoskeletal (-) negative ROS    Abdominal    Substance History - negative use     OB/GYN    (+) Pregnant        Other                    Anesthesia Plan    ASA 2     spinal       Anesthetic plan, risks, benefits, and alternatives have been provided, discussed and informed consent has been obtained with: patient.    CODE STATUS:    Level Of Support Discussed With: Patient  Code Status (Patient has no pulse and is not breathing): CPR (Attempt to Resuscitate)  Medical Interventions (Patient has pulse or is breathing): Full

## 2024-06-07 NOTE — ANESTHESIA PROCEDURE NOTES
Spinal Block      Patient location during procedure: OR  Start Time: 6/7/2024 7:33 AM  Stop Time: 6/7/2024 7:38 AM  Indication:at surgeon's request  Performed By  CRNA/CAA: Salinas Heredia CRNA  Preanesthetic Checklist  Completed: patient identified, IV checked, risks and benefits discussed, surgical consent, monitors and equipment checked, pre-op evaluation and timeout performed  Spinal Block Prep:  Patient Position:sitting  Sterile Tech:cap, gloves, mask and sterile barriers  Prep:Betadine  Patient Monitoring:blood pressure monitoring, EKG and continuous pulse oximetry    Spinal Block Procedure  Approach:midline  Guidance:landmark technique  Location:L2-L3  Needle Type:Pencan  Needle Gauge:24 G  Placement of Spinal needle event:cerebrospinal fluid aspirated  Paresthesia: no  Fluid Appearance:clear (blood tinged then clearing)  Medications: morphine PF (DURAMORPH) injection - Intrathecal   0.2 mg - 6/7/2024 7:38:00 AM  bupivacaine PF (MARCAINE) injection 0.75% - Intrathecal   1.6 mL - 6/7/2024 7:38:00 AM   Post Assessment  Patient Tolerance:patient tolerated the procedure well with no apparent complications  Complications no  Additional Notes  Two attempts.  Pt has herniated disc disease that she told me about during the procedure.  1st attempt at L3-4 felt ossified and was uncomfortable for the patient. After 3 unsuccessful redirections I stopped and went up one level.  After one redirection it was successful and it was overall less uncomfortable for the pt.

## 2024-06-07 NOTE — L&D DELIVERY NOTE
"[facility]   Delivery Procedure Report    Patient Name:  Mali Mercer  YOB: 1995  MRN: [id number]  Date of Procedure:  2024     Pre-Operative Diagnosis:   28 y.o.  at 39w3d    Previous  delivery affecting pregnancy    Tobacco use during pregnancy, antepartum    Obesity affecting pregnancy, antepartum    Post Operative Diagnosis:  28 y.o.  at 39w3d    Previous  delivery affecting pregnancy    Tobacco use during pregnancy, antepartum    Obesity affecting pregnancy, antepartum  Status post repeat low-transverse  delivery    Procedure(s):   SECTION REPEAT    Surgeon: Surgeon(s):  Drew Jennings MD    Assistant: Dinora Reynaga MD    Anesthesia:   Spinal    Estimated Blood Loss: 600 mL    Antibiotics:   Kefzol    Specimens:          Placenta - discarded    Findings:  Delivery Date:                       2024   Delivery Time:                       7:55 AM     Infant:                                    female  infant                                     No birth weight on file.   APGAR:                                  @ 1 minute /   @ 5 minutes  Cord:   present.   Nuchal Cord:  no     Placenta Delivered:  Manual removal  at   2024  7:55 AM     Cord Blood Obtained:     Cord Gases Obtained:      Cord Gas Results: Venous:  No results found for: \"PHCVEN\", \"BECVEN\"    Arterial:  No results found for: \"PHCART\", \"BECART\"       Complications: None    Description of Procedure:  After reviewing the informed consent, the patient expressed her understanding and desire to proceed.  She was taken to the operating room with an IV in place and running.  She was placed on the operating table in the sitting position.  The patient was given a spinal anesthetic.  She was placed in the dorsal supine position with leftward tilt.  A sterile Marin catheter was inserted into the patient's bladder.  The patient was then prepped and draped in " the usual sterile fashion. After a surgical timeout was performed, and the patient's anesthesia was found to be adequate I made a Pfannenstiel skin incision with the scalpel.  The incision was carried down to the underlying fascia with the cautery. The fascia was nicked in the midline. The fascia was extended laterally, in a curvilinear fashion with Morton scissors.  I used 2 kochers to grasp the superior portion of the fascia, and this was taken off the rectus muscle sharply.  The kochers were removed and replaced on the posterior portion of the fascia. The fascia was taken off the rectus muscle sharply.  The midline was identified, tented up with 2 hemostats, and entered sharply.  The peritoneum was extended in a sharp fashion with good visualization of the bladder.  The bladder blade was placed.  The bladder flap was developed sharply.  I made a low transverse uterine incision with the scalpel.  The uterine cavity was entered bluntly, and membranes were artificially ruptured.  The fluid color was clear.  The fetal head was gently lifted to the hysterotomy, and the baby was delivered with gentle fundal pressure at 7:55 AM.  After complete delivery of the infant, the mouth and nose were bulb suctioned, the cord was doubly clamped and cut, and the baby was passed off to the awaiting nurses. Apgars were 8 and 9 at 1 and 5 minutes respectively. The baby's birth weight was pending at time of this dictation. Cord blood and gases were collected, and the placenta was delivered manually and intact at 7:57 AM.  The uterus was then exteriorized, and the endometrium was curetted with a dry lap.  The hysterotomy was then closed in 2 layers.  The first layer was closed with a running 0 Monocryl suture.  The second layer was closed with a horizontal imbricating 0 Monocryl suture.  There was excellent hemostasis after closure of both layers.  I irrigated posterior to the uterus.  The uterus was reinserted into the peritoneal  cavity.  The paracolic gutters were copiously irrigated.  The hysterotomy was reinspected, and noted to be hemostatic.  3 Zelda clamps were used to grasp the peritoneum, and the peritoneum was closed with a 3-0 Monocryl suture in a running fashion.  The rectus muscle was reapproximated with 3 interrupted horizontal mattress sutures using a #1 chromic suture.  The fascia was then closed in a running fashion using a 0 Vicryl suture.  The subcutaneous spaces were copiously irrigated, and hemostasis was achieved with the cautery.  The subcutaneous space was then reapproximated with a 3-0 Monocryl suture in a running fashion.  The skin was closed with a 4-0 Monocryl suture in a subcuticular fashion.  A sterile towels applied over the incision.  The patient was placed in a frog-leg position, and the drapes were removed.  I expressed any remaining blood and clot from the uterus.  A sterile bandage was applied to the incision, and the patient was transferred to the recovery room in satisfactory condition.  The patient received Kefzol as her preoperative antibiotics.  All counts were correct x2 at the end of the procedure.  Both mother and  are recovering in excellent condition in the recovery area.       Dr. Dinora Reynaga MD was responsible for performing the following activities: Retraction, Suction, Irrigation, and Delivery of Fetus and their skilled assistance was necessary for the success of this case.    Drew Jennings MD     Date: 2024  Time: 08:21 EDT

## 2024-06-07 NOTE — DISCHARGE SUMMARY
[facility]         Discharge Summary    Patient Name: Mali Mercer  : 1995  MRN: [id number]  Primary Care Physician: Kellen Casanova MD    Date of Admission: 2024  Date of Discharge:  2024     Consults       No orders found from 2024 to 2024.             Procedures:  Procedure(s):   SECTION REPEAT    Presenting Problem:   Previous  delivery affecting pregnancy [O34.219]    Admitting Diagnosis:  28 y.o.  at 39w3d     delivery delivered    Previous  delivery affecting pregnancy    Tobacco use during pregnancy, antepartum    Obesity affecting pregnancy, antepartum    Discharge Diagnosis:  28 y.o.  at 39w3d     delivery delivered    Previous  delivery affecting pregnancy    Tobacco use during pregnancy, antepartum    Obesity affecting pregnancy, antepartum      Delivery Summary     OB Surgeon: Drew Jennings M.D.  Anesthesia: Spinal  Delivery Type:  LTCS  Perineum: OBPERINEUM: Intact  Feeding method: Breast    Infant: female  infant;    Weight: 3130 g (6 lb 14.4 oz)     APGARS: 8  @ 1 minute / 9  @ 5 minutes   Venous Blood Gas:   pH, Cord Venous   Date Value Ref Range Status   2024 7.353 7.260 - 7.400 pH Units Final     Base Excess, Cord Venous   Date Value Ref Range Status   2024 -0.8 -30.0 - 30.0 mmol/L Final     Comment:     Serial Number: [id number]  [id number]      Arterial Blood Gas:   pH, Cord Arterial   Date Value Ref Range Status   2024 7.31 7.18 - 7.34 pH Units Final     Base Exc, Cord Arterial   Date Value Ref Range Status   2024 -0.5 -2.0 - 2.0 mmol/L Final     Comment:     Serial Number: [id number]  [id number]        Hospital Course     Hospital Course:  Mali Mercer is a 28 y.o.  39w3d who presented on 2024 for a scheduled repeat  delivery at term.  She had no complaints at time of her presentation.  Full details of the procedure  please see the separate dictated operative narrative.  After initial recovery in the PACU the patient was transferred to the postpartum unit for further postpartum care.  She had an uncomplicated postpartum course.  Her Marin catheter was continued on the day of surgery.  She was noted to void without difficulty.  Her postpartum day 1 labs were stable.  By postpartum day 2, day of discharge she was doing well.  She was afebrile her vital signs were stable throughout the hospital stay.  Her pain was well-controlled.  She was ambulating without difficulty.  She was voiding without difficulty.  She was tolerating a regular diet without nausea or vomiting.  Her exam was benign.  Her incision was clean, dry, intact and healing well without sign of infection.  Her lochia was appropriate.  She was meeting all appropriate postpartum milestones and desired discharge home.    Day of Discharge     Vital Signs:  Temp:  [98.2 °F (36.8 °C)] 98.2 °F (36.8 °C)  Heart Rate:  [80-82] 80  Resp:  [16] 16  BP: (114-121)/(67-78) 114/67    Pertinent  and/or Most Recent Results     LAB RESULTS:       Lab 06/08/24  0538 06/07/24  0616   WBC 10.68 14.39*   HEMOGLOBIN 10.8* 11.2*   HEMATOCRIT 33.5* 33.6*   PLATELETS 259 284   NEUTROS ABS 7.06* 10.60*   IMMATURE GRANS (ABS) 0.05 0.10*   LYMPHS ABS 2.47 2.51   MONOS ABS 0.88 1.00*   EOS ABS 0.17 0.14   MCV 87.5 84.2                 Lab 06/07/24  0616   ABO TYPING A   RH TYPING Positive   ANTIBODY SCREEN Negative     URINALYSIS@  Microbiology Results (last 10 days)       ** No results found for the last 240 hours. **             Discharge Details        Discharge Medications        New Medications        Instructions Start Date   docusate sodium 100 MG capsule  Commonly known as: Colace   100 mg, Oral, 2 Times Daily      HYDROcodone-acetaminophen 5-325 MG per tablet  Commonly known as: Norco   Take 1-2 tablets by mouth Every 6 (Six) Hours As Needed for  (postop pain).      ibuprofen 600 MG  tablet  Commonly known as: ADVIL,MOTRIN   600 mg, Oral, Every 6 Hours PRN             Continue These Medications        Instructions Start Date   prenatal (CLASSIC) vitamin 28-0.8 MG tablet tablet  Generic drug: prenatal vitamin   Oral, Daily             Stop These Medications      ferrous sulfate 325 (65 FE) MG tablet              Allergies   Allergen Reactions    Sulfa Antibiotics Rash       Discharge Disposition:   Home, self-care    Discharge Condition:  Good    Diet:   Regular    Discharge Activity:  Pelvic rest for 6 weeks, no intercourse for 6 weeks, no tampons or douching for 6 weeks, nothing in the vagina for 6 weeks  No driving for 2 weeks  No lifting more than 15 to 20 pounds for 2 weeks  No tub baths for 2 weeks, but may shower  Keep the incision clean and dry    Call the office or go to the emergency department for temperature greater than 100 °F, shortness of breath or chest pain, excessive nausea or vomiting, pain that is worsening despite current pain medications, redness, swelling, or drainage from the incision site, vaginal bleeding soaking a pad in less than 1 hour.    Follow Up:  Future Appointments   Date Time Provider Department Center   7/9/2024  9:00 AM Drew Jennings MD [facility]   2/25/2025  2:30 PM Kellen Casanova MD [facility]       Electronically signed by Drew Jennings MD, 06/07/24, 8:26 AM EDT.

## 2024-06-07 NOTE — PLAN OF CARE
Problem: Bleeding ( Delivery)  Goal: Bleeding is Controlled  Outcome: Met     Problem: Change in Fetal Wellbeing ( Delivery)  Goal: Stable Fetal Wellbeing  Outcome: Met     Problem: Respiratory Compromise ( Delivery)  Goal: Effective Oxygenation and Ventilation  Outcome: Met     Problem: Breastfeeding  Goal: Effective Breastfeeding  Outcome: Met   Goal Outcome Evaluation:

## 2024-06-08 LAB
BASOPHILS # BLD AUTO: 0.05 10*3/MM3 (ref 0–0.2)
BASOPHILS NFR BLD AUTO: 0.5 % (ref 0–1.5)
DEPRECATED RDW RBC AUTO: 43.6 FL (ref 37–54)
EOSINOPHIL # BLD AUTO: 0.17 10*3/MM3 (ref 0–0.4)
EOSINOPHIL NFR BLD AUTO: 1.6 % (ref 0.3–6.2)
ERYTHROCYTE [DISTWIDTH] IN BLOOD BY AUTOMATED COUNT: 13.7 % (ref 12.3–15.4)
HCT VFR BLD AUTO: 33.5 % (ref 34–46.6)
HGB BLD-MCNC: 10.8 G/DL (ref 12–15.9)
IMM GRANULOCYTES # BLD AUTO: 0.05 10*3/MM3 (ref 0–0.05)
IMM GRANULOCYTES NFR BLD AUTO: 0.5 % (ref 0–0.5)
LYMPHOCYTES # BLD AUTO: 2.47 10*3/MM3 (ref 0.7–3.1)
LYMPHOCYTES NFR BLD AUTO: 23.1 % (ref 19.6–45.3)
MCH RBC QN AUTO: 28.2 PG (ref 26.6–33)
MCHC RBC AUTO-ENTMCNC: 32.2 G/DL (ref 31.5–35.7)
MCV RBC AUTO: 87.5 FL (ref 79–97)
MONOCYTES # BLD AUTO: 0.88 10*3/MM3 (ref 0.1–0.9)
MONOCYTES NFR BLD AUTO: 8.2 % (ref 5–12)
NEUTROPHILS NFR BLD AUTO: 66.1 % (ref 42.7–76)
NEUTROPHILS NFR BLD AUTO: 7.06 10*3/MM3 (ref 1.7–7)
NRBC BLD AUTO-RTO: 0 /100 WBC (ref 0–0.2)
PLATELET # BLD AUTO: 259 10*3/MM3 (ref 140–450)
PMV BLD AUTO: 9.5 FL (ref 6–12)
RBC # BLD AUTO: 3.83 10*6/MM3 (ref 3.77–5.28)
WBC NRBC COR # BLD AUTO: 10.68 10*3/MM3 (ref 3.4–10.8)

## 2024-06-08 PROCEDURE — 85025 COMPLETE CBC W/AUTO DIFF WBC: CPT | Performed by: OBSTETRICS & GYNECOLOGY

## 2024-06-08 PROCEDURE — 25010000002 KETOROLAC TROMETHAMINE PER 15 MG: Performed by: OBSTETRICS & GYNECOLOGY

## 2024-06-08 PROCEDURE — 25010000002 ENOXAPARIN PER 10 MG: Performed by: OBSTETRICS & GYNECOLOGY

## 2024-06-08 RX ADMIN — KETOROLAC TROMETHAMINE 15 MG: 15 INJECTION, SOLUTION INTRAMUSCULAR; INTRAVENOUS at 09:48

## 2024-06-08 RX ADMIN — IBUPROFEN 600 MG: 600 TABLET, FILM COATED ORAL at 21:53

## 2024-06-08 RX ADMIN — KETOROLAC TROMETHAMINE 15 MG: 15 INJECTION, SOLUTION INTRAMUSCULAR; INTRAVENOUS at 05:25

## 2024-06-08 RX ADMIN — VITAMIN A, VITAMIN C, VITAMIN D, VITAMIN E, THIAMINE, RIBOFLAVIN, NIACIN, VITAMIN B6, FOLIC ACID, VITAMIN B12, CALCIUM, IRON, ZINC, COPPER 1 TABLET: 4000; 120; 400; 22; 1.84; 3; 20; 10; 1; 12; 200; 27; 25; 2 TABLET ORAL at 09:49

## 2024-06-08 RX ADMIN — DOCUSATE SODIUM 100 MG: 100 CAPSULE, LIQUID FILLED ORAL at 09:50

## 2024-06-08 RX ADMIN — DOCUSATE SODIUM 100 MG: 100 CAPSULE, LIQUID FILLED ORAL at 21:51

## 2024-06-08 RX ADMIN — ACETAMINOPHEN 650 MG: 325 TABLET ORAL at 15:31

## 2024-06-08 RX ADMIN — ENOXAPARIN SODIUM 40 MG: 100 INJECTION SUBCUTANEOUS at 09:49

## 2024-06-08 RX ADMIN — ACETAMINOPHEN 1000 MG: 500 TABLET ORAL at 02:26

## 2024-06-08 RX ADMIN — ACETAMINOPHEN 650 MG: 325 TABLET ORAL at 18:47

## 2024-06-08 RX ADMIN — ACETAMINOPHEN 1000 MG: 500 TABLET ORAL at 09:49

## 2024-06-08 RX ADMIN — IBUPROFEN 600 MG: 600 TABLET, FILM COATED ORAL at 16:19

## 2024-06-08 NOTE — PROGRESS NOTES
[facility]   PROGRESS NOTE    Post-Op Day 1 S/P   Subjective     Patient reports:  Pain is well controlled with acetaminophen, ibuprofen (OTC), and narcotic analgesics including Norco.  She is ambulating. Tolerating diet. Tolerating po -- normal.  Intake -- c/o of tolerating po solids and tolerating po liquids.   Voiding - without difficulty; flatus reported..  Vaginal bleeding is as much as expected.      Objective      Vitals: Vital Signs Range for the last 24 hours  Temperature: Temp:  [97.7 °F (36.5 °C)-98.2 °F (36.8 °C)] 98.2 °F (36.8 °C)   Temp Source: Temp src: Oral   BP: BP: (108-121)/(61-78) 121/78   Pulse: Heart Rate:  [74-88] 82   Respirations: Resp:  [14-18] 16   SPO2:     O2 Amount (l/min):     O2 Devices     Weight:              Physical Exam    Lungs clear to auscultation bilaterally   Abdomen Soft, non-tender, normal bowel sounds; no bruits, organomegaly or masses.   Incision  healing well   Extremities Trace edema      I reviewed the patient's new clinical results.    Assessment & Plan         delivery delivered    Previous  delivery affecting pregnancy    Tobacco use during pregnancy, antepartum    Obesity affecting pregnancy, antepartum      Assessment:    Mali Mercer is Day 1  post-partum  , Low Transverse   .       Plan:  remove dressing, remove urine catheter, saline lock IV fluids, advance diet  normal diet as tolerated, and continue post op care.        Dejan Armando MD  2024  12:51 EDT

## 2024-06-08 NOTE — ANESTHESIA POSTPROCEDURE EVALUATION
Patient: Mali Mercer    Procedure Summary       Date: 24 Room / Location: [facility] LABOR DELIVERY  [facility] LABOR DELIVERY    Anesthesia Start: 730 Anesthesia Stop: 830    Procedure:  SECTION REPEAT (Abdomen) Diagnosis: (715 REPEAT C/S  DR RANGEL)    Surgeons: Drew Rangel MD Provider: Salinas Heredia CRNA    Anesthesia Type: spinal ASA Status: 2            Anesthesia Type: spinal    Vitals  Vitals Value Taken Time   /62 24 0524   Temp 36.5 °C (97.7 °F) 24 0524   Pulse 79 24 0524   Resp 16 24 0524   SpO2 96 % 24 1137   Vitals shown include unfiled device data.        Post Anesthesia Care and Evaluation    Patient location during evaluation: bedside  Patient participation: complete - patient participated  Level of consciousness: awake  Pain score: 0  Pain management: adequate    Airway patency: patent  Anesthetic complications: No anesthetic complications  PONV Status: controlled  Cardiovascular status: acceptable and stable  Respiratory status: acceptable  Hydration status: acceptable  Post Neuraxial Block status: Motor and sensory function returned to baseline and No signs or symptoms of PDPH

## 2024-06-08 NOTE — PLAN OF CARE
Problem: Adult Inpatient Plan of Care  Goal: Plan of Care Review  Outcome: Ongoing, Progressing  Goal: Patient-Specific Goal (Individualized)  Outcome: Ongoing, Progressing  Goal: Absence of Hospital-Acquired Illness or Injury  Outcome: Ongoing, Progressing  Intervention: Identify and Manage Fall Risk  Recent Flowsheet Documentation  Taken 2024 1300 by Vielka Sesay RN  Safety Promotion/Fall Prevention: safety round/check completed  Taken 2024 1112 by Vielka Sesay RN  Safety Promotion/Fall Prevention: safety round/check completed  Taken 2024 1000 by Vielka Sesay RN  Safety Promotion/Fall Prevention: safety round/check completed  Taken 2024 0950 by Vielka Sesay RN  Safety Promotion/Fall Prevention: safety round/check completed  Taken 2024 0945 by Vielka Sesay RN  Safety Promotion/Fall Prevention: safety round/check completed  Taken 2024 0730 by Vielka Sesay RN  Safety Promotion/Fall Prevention: safety round/check completed  Intervention: Prevent and Manage VTE (Venous Thromboembolism) Risk  Recent Flowsheet Documentation  Taken 2024 1000 by Vielka Sesay RN  VTE Prevention/Management:   sequential compression devices off   patient refused intervention  Goal: Optimal Comfort and Wellbeing  Outcome: Ongoing, Progressing  Intervention: Provide Person-Centered Care  Recent Flowsheet Documentation  Taken 2024 1000 by Vielka Sesay RN  Trust Relationship/Rapport:   care explained   choices provided   questions answered   questions encouraged  Goal: Readiness for Transition of Care  Outcome: Ongoing, Progressing     Problem: Adjustment to Role Transition (Postpartum  Delivery)  Goal: Successful Maternal Role Transition  Outcome: Ongoing, Progressing     Problem: Bleeding (Postpartum  Delivery)  Goal: Hemostasis  Outcome: Ongoing, Progressing     Problem: Infection (Postpartum  Delivery)  Goal: Absence of Infection Signs and Symptoms  Outcome: Ongoing,  Progressing     Problem: Pain (Postpartum  Delivery)  Goal: Acceptable Pain Control  Outcome: Ongoing, Progressing     Problem: Postoperative Nausea and Vomiting (Postpartum  Delivery)  Goal: Nausea and Vomiting Relief  Outcome: Ongoing, Progressing     Problem: Postoperative Urinary Retention (Postpartum  Delivery)  Goal: Effective Urinary Elimination  Outcome: Ongoing, Progressing   Goal Outcome Evaluation:

## 2024-06-08 NOTE — LACTATION NOTE
Pt ask for supplement after feeding at breast, discussed pumping with pt and she wished to hand pump, discussed pumping and expectations gone over. Supplies given to wash pump parts after pumping. Discussed supplementing amounts. Pt and SO verb understanding. Encouraged feeding at breast first.

## 2024-06-08 NOTE — PLAN OF CARE
Problem: Adult Inpatient Plan of Care  Goal: Plan of Care Review  Outcome: Ongoing, Progressing  Goal: Patient-Specific Goal (Individualized)  Outcome: Ongoing, Progressing  Goal: Absence of Hospital-Acquired Illness or Injury  Outcome: Ongoing, Progressing  Intervention: Identify and Manage Fall Risk  Recent Flowsheet Documentation  Taken 2024 by Wanda Kang RN  Safety Promotion/Fall Prevention:   assistive device/personal items within reach   clutter free environment maintained   nonskid shoes/slippers when out of bed   room organization consistent   safety round/check completed  Intervention: Prevent Skin Injury  Recent Flowsheet Documentation  Taken 2024 by Wanda Kang RN  Body Position: position changed independently  Intervention: Prevent and Manage VTE (Venous Thromboembolism) Risk  Recent Flowsheet Documentation  Taken 2024 by Wanda Kang RN  Activity Management: up ad patric  VTE Prevention/Management:   bilateral   sequential compression devices on  Goal: Optimal Comfort and Wellbeing  Outcome: Ongoing, Progressing  Intervention: Monitor Pain and Promote Comfort  Recent Flowsheet Documentation  Taken 2024 by Wanda Kang RN  Pain Management Interventions:   see MAR   care clustered   pain management plan reviewed with patient/caregiver  Intervention: Provide Person-Centered Care  Recent Flowsheet Documentation  Taken 2024 by Wanda Kang RN  Trust Relationship/Rapport:   care explained   choices provided   emotional support provided   empathic listening provided   questions answered   questions encouraged   reassurance provided   thoughts/feelings acknowledged  Goal: Readiness for Transition of Care  Outcome: Ongoing, Progressing     Problem: Adjustment to Role Transition (Postpartum  Delivery)  Goal: Successful Maternal Role Transition  Outcome: Ongoing, Progressing     Problem: Bleeding (Postpartum  Delivery)  Goal:  Hemostasis  Outcome: Ongoing, Progressing     Problem: Infection (Postpartum  Delivery)  Goal: Absence of Infection Signs and Symptoms  Outcome: Ongoing, Progressing     Problem: Pain (Postpartum  Delivery)  Goal: Acceptable Pain Control  Outcome: Ongoing, Progressing  Intervention: Prevent or Manage Pain  Recent Flowsheet Documentation  Taken 2024 by Wanda Kang RN  Pain Management Interventions:   see MAR   care clustered   pain management plan reviewed with patient/caregiver     Problem: Postoperative Nausea and Vomiting (Postpartum  Delivery)  Goal: Nausea and Vomiting Relief  Outcome: Ongoing, Progressing     Problem: Postoperative Urinary Retention (Postpartum  Delivery)  Goal: Effective Urinary Elimination  Outcome: Ongoing, Progressing   Goal Outcome Evaluation:

## 2024-06-09 VITALS
OXYGEN SATURATION: 96 % | HEIGHT: 63 IN | HEART RATE: 73 BPM | TEMPERATURE: 98.4 F | WEIGHT: 209 LBS | SYSTOLIC BLOOD PRESSURE: 120 MMHG | DIASTOLIC BLOOD PRESSURE: 83 MMHG | RESPIRATION RATE: 18 BRPM | BODY MASS INDEX: 37.03 KG/M2

## 2024-06-09 PROCEDURE — 25010000002 ENOXAPARIN PER 10 MG: Performed by: OBSTETRICS & GYNECOLOGY

## 2024-06-09 RX ADMIN — IBUPROFEN 600 MG: 600 TABLET, FILM COATED ORAL at 05:24

## 2024-06-09 RX ADMIN — ENOXAPARIN SODIUM 40 MG: 100 INJECTION SUBCUTANEOUS at 08:58

## 2024-06-09 RX ADMIN — IBUPROFEN 600 MG: 600 TABLET, FILM COATED ORAL at 10:54

## 2024-06-09 RX ADMIN — ACETAMINOPHEN 650 MG: 325 TABLET ORAL at 00:52

## 2024-06-09 RX ADMIN — DOCUSATE SODIUM 100 MG: 100 CAPSULE, LIQUID FILLED ORAL at 08:59

## 2024-06-09 RX ADMIN — VITAMIN A, VITAMIN C, VITAMIN D, VITAMIN E, THIAMINE, RIBOFLAVIN, NIACIN, VITAMIN B6, FOLIC ACID, VITAMIN B12, CALCIUM, IRON, ZINC, COPPER 1 TABLET: 4000; 120; 400; 22; 1.84; 3; 20; 10; 1; 12; 200; 27; 25; 2 TABLET ORAL at 08:58

## 2024-06-09 RX ADMIN — ACETAMINOPHEN 650 MG: 325 TABLET ORAL at 14:15

## 2024-06-09 RX ADMIN — ACETAMINOPHEN 650 MG: 325 TABLET ORAL at 06:30

## 2024-06-09 NOTE — PLAN OF CARE
Problem: Adult Inpatient Plan of Care  Goal: Plan of Care Review  Outcome: Met  Goal: Patient-Specific Goal (Individualized)  Outcome: Met  Goal: Absence of Hospital-Acquired Illness or Injury  Outcome: Met  Intervention: Identify and Manage Fall Risk  Recent Flowsheet Documentation  Taken 6/9/2024 1415 by Jessi Copeland RN  Safety Promotion/Fall Prevention: safety round/check completed  Taken 6/9/2024 1206 by Jessi Copeland RN  Safety Promotion/Fall Prevention: safety round/check completed  Taken 6/9/2024 1148 by Jessi Copeland RN  Safety Promotion/Fall Prevention: safety round/check completed  Taken 6/9/2024 1054 by Jessi Copeland RN  Safety Promotion/Fall Prevention: safety round/check completed  Taken 6/9/2024 0918 by Jessi Copeland RN  Safety Promotion/Fall Prevention:   safety round/check completed   clutter free environment maintained   assistive device/personal items within reach   room organization consistent  Taken 6/9/2024 0900 by Jessi Copeland RN  Safety Promotion/Fall Prevention: safety round/check completed  Intervention: Prevent and Manage VTE (Venous Thromboembolism) Risk  Recent Flowsheet Documentation  Taken 6/9/2024 0918 by Jessi Copeland RN  Activity Management: up ad patric  VTE Prevention/Management: (patient ambulates in room frequently)   bilateral   sequential compression devices off  Goal: Optimal Comfort and Wellbeing  Outcome: Met  Intervention: Monitor Pain and Promote Comfort  Recent Flowsheet Documentation  Taken 6/9/2024 0918 by Jessi Copeland RN  Pain Management Interventions:   pain management plan reviewed with patient/caregiver   medication offered but refused  Intervention: Provide Person-Centered Care  Recent Flowsheet Documentation  Taken 6/9/2024 0918 by Jessi Copeland RN  Trust Relationship/Rapport:   care explained   choices provided   emotional support provided   empathic listening provided   questions answered   questions encouraged   reassurance  provided   thoughts/feelings acknowledged  Goal: Readiness for Transition of Care  Outcome: Met     Problem: Adjustment to Role Transition (Postpartum  Delivery)  Goal: Successful Maternal Role Transition  Outcome: Met     Problem: Bleeding (Postpartum  Delivery)  Goal: Hemostasis  Outcome: Met     Problem: Infection (Postpartum  Delivery)  Goal: Absence of Infection Signs and Symptoms  Outcome: Met     Problem: Pain (Postpartum  Delivery)  Goal: Acceptable Pain Control  Outcome: Met  Intervention: Prevent or Manage Pain  Recent Flowsheet Documentation  Taken 2024 by Jessi Copeland RN  Pain Management Interventions:   pain management plan reviewed with patient/caregiver   medication offered but refused     Problem: Postoperative Nausea and Vomiting (Postpartum  Delivery)  Goal: Nausea and Vomiting Relief  Outcome: Met     Problem: Postoperative Urinary Retention (Postpartum  Delivery)  Goal: Effective Urinary Elimination  Outcome: Met   Goal Outcome Evaluation:

## 2024-06-09 NOTE — PLAN OF CARE
Problem: Adult Inpatient Plan of Care  Goal: Plan of Care Review  Outcome: Ongoing, Progressing     Problem: Adult Inpatient Plan of Care  Goal: Plan of Care Review  Outcome: Ongoing, Progressing   Goal Outcome Evaluation:

## 2024-06-09 NOTE — PROGRESS NOTES
[facility] Delivery Progress Note    Subjective   Postpartum Day 2:  Delivery    The patient feels well.  Her pain is being controlled.   She is ambulating well.  Patient describes her bleeding as moderate lochia.  Patient is tolerating regular diet and urinating without difficulty. Patient ispassing flatus. Patient has not had a bowel movement.     Feeding:    breast and bottle feeding        Objective     Vital Signs Range for the last 24 hours  Temperature: Temp:  [98.2 °F (36.8 °C)] 98.2 °F (36.8 °C)   Temp Source: Temp src: Oral   BP: BP: (114-121)/(67-78) 114/67   Pulse: Heart Rate:  [80-82] 80   Respirations: Resp:  [16] 16     Physical Exam:  General:  no acute distress.  Abdomen: abdomen is soft without significant tenderness, masses, organomegaly or guarding.   Fundus: appropriate, firm, non tender, moderate lochia  Incision: clean, dry and intact  Extremities: normal, atraumatic, no cyanosis, and trace edema.     Rubella:   Rubella Antibodies, IgG   Date Value Ref Range Status   2023 5.70 Immune >0.99 index Final     Comment:                                     Non-immune       <0.90                                  Equivocal  0.90 - 0.99                                  Immune           >0.99     Rh Status:    RH type   Date Value Ref Range Status   2024 Positive  Final     Immunizations:   Immunization History   Administered Date(s) Administered    MMR 2017       Assessment & Plan        delivery delivered    Previous  delivery affecting pregnancy    Tobacco use during pregnancy, antepartum    Obesity affecting pregnancy, antepartum      Mali Mehreen Mercer is Day 2  post-partum  , Low Transverse   .      Plan:  She thinks she is ready for discharge. Will go ahead and order discharge but hold if she decides she requires more time in hospital.      Electronically signed by Dinora Reynaga MD, 24, 7:31 AM EDT.

## 2024-06-09 NOTE — LACTATION NOTE
Pt states baby is latching some and also still supplementing some. D/C instructions gone over, included hand hygiene, respiratory hygiene and breastfeeding when mom is sick, LC encouraged pt to see pediatrician within two days of discharge for follow up. LC discussed  breastfeeding behaviors, first two weeks of breastfeeding expectations, encouraged her to breastfeed/pump frequently for good milk supply. LC discussed nipple care, plugged ducts, engorgement, and breast infection. LC informed pt that LC was available after D/C for assistance with breastfeeding.

## 2024-06-14 ENCOUNTER — TELEPHONE (OUTPATIENT)
Dept: LACTATION | Facility: HOSPITAL | Age: 29
End: 2024-06-14
Payer: COMMERCIAL

## 2024-06-14 NOTE — TELEPHONE ENCOUNTER
LC returned this patient's call about using benadryl while breastfeeding. She states she had a reaction to something she took in the hospital and has a rash or hives. LC advised her that it is usually considered safe for baby but may decrease her milk supply. She also had concerns about baby having a yeast infection on her bottom and in her mouth. She is to see the pediatrician for this tomorrow. MARIO advised her that if the pediatrician feels like it is yeast she should be treated as well and discussed cleaning of her breasts, bras and pumping parts. Patient expressed good understanding over the phone.

## 2024-06-18 ENCOUNTER — MATERNAL SCREENING (OUTPATIENT)
Dept: CALL CENTER | Facility: HOSPITAL | Age: 29
End: 2024-06-18
Payer: COMMERCIAL

## 2024-06-18 NOTE — OUTREACH NOTE
Maternal Screening Survey      Flowsheet Row Responses   Facility patient discharged from? [facility]   Attempt successful? Yes   Call start time 124   Call end time 1247   EPD Scale: Able to Laugh 0-->as much as she always could   EPD Scale: Looked Forward 0-->as much as she ever did   EPD Scale: Blamed Self 0-->no, never   EPD Scale: Been Anxious 0-->no, not at all   EPD Scale: Felt Panicky 0-->no, not at all   EPD Scale: Things Getting on Top 0-->no, has been coping as well as ever   EPD Scale: Difficulty Sleeping 0-->no, not at all   EPD Scale: Sad or Miserable 0-->no, not at all   EPD Scale: Crying 0-->no, never   EPD Scale: Thought of Harming Self 0-->never   [place]  Depression Score 0   Did any of your parents have problems with alcohol or drug use? No   Do any of your peers have problems with alcohol or drug use? No   Does your partner have problems with alcohol or drug use? No   Before you were pregnant did you have problems with alcohol or drug use? (past) No   In the past month, did you drink beer, wine, liquor or use any other drugs? (pregnancy) No   Maternal Screening call completed Yes   Call end time 1247              Jackie MANDUJANO - Registered Nurse           dennis 78 m with mult med problems with fall unk mechanism hit his head ? loc after pulling up pants- pt hit head- followed by witnessed tonic clonic sz activity no tongue bite no incontinenece not post ictal - -pt w no prev sz actyivity - no anticaog, asa only - pt no ha currently nonfocal neuro exam wo weakness or numbness no neck pain cleared clincailly - perrl fs nml -- will ctcta r/o bleed r/o aneurysm --- unsure of sz v syncope

## 2024-07-09 ENCOUNTER — POSTPARTUM VISIT (OUTPATIENT)
Dept: OBSTETRICS AND GYNECOLOGY | Facility: CLINIC | Age: 29
End: 2024-07-09
Payer: COMMERCIAL

## 2024-07-09 VITALS
SYSTOLIC BLOOD PRESSURE: 126 MMHG | BODY MASS INDEX: 35.08 KG/M2 | HEIGHT: 63 IN | DIASTOLIC BLOOD PRESSURE: 84 MMHG | WEIGHT: 198 LBS

## 2024-07-09 PROBLEM — K08.89 PAIN, DENTAL: Status: RESOLVED | Noted: 2024-01-05 | Resolved: 2024-07-09

## 2024-07-09 PROBLEM — Z34.80 SUPERVISION OF OTHER NORMAL PREGNANCY, ANTEPARTUM: Status: RESOLVED | Noted: 2023-11-07 | Resolved: 2024-07-09

## 2024-07-09 PROBLEM — O26.13 LOW WEIGHT GAIN DURING PREGNANCY IN THIRD TRIMESTER: Status: RESOLVED | Noted: 2024-03-26 | Resolved: 2024-07-09

## 2024-07-09 PROBLEM — O99.210 OBESITY AFFECTING PREGNANCY, ANTEPARTUM: Status: RESOLVED | Noted: 2023-11-08 | Resolved: 2024-07-09

## 2024-07-09 PROBLEM — O34.219 PREVIOUS CESAREAN DELIVERY AFFECTING PREGNANCY: Status: RESOLVED | Noted: 2023-11-07 | Resolved: 2024-07-09

## 2024-07-09 PROBLEM — O99.330 TOBACCO USE DURING PREGNANCY, ANTEPARTUM: Status: RESOLVED | Noted: 2023-11-07 | Resolved: 2024-07-09

## 2024-07-09 PROCEDURE — 0503F POSTPARTUM CARE VISIT: CPT | Performed by: OBSTETRICS & GYNECOLOGY

## 2024-07-09 NOTE — PROGRESS NOTES
"[facility]  Postpartum Follow Up Visit    CC:  Postpartum follow up     Antepartum or Postpartum Complications: Prior , Tobacco use  Delivery type:   LTCS  Perineum : Intact  Feeding: Bottle    Subjective:     Headache:  No  Vision changes:  No  RUQ/epigastric pain:  No  Swelling:  No  Pain:  No  Vaginal Bleeding:  No  Depressed/Anxious:  No  EPDS score: 1    Objective:   /84   Ht 160 cm (63\")   Wt 89.8 kg (198 lb)   LMP 2023 (Approximate)   Breastfeeding Yes   BMI 35.07 kg/m²     Physical Exam  Vitals and nursing note reviewed. Exam conducted with a chaperone present.   Constitutional:       General: She is not in acute distress.     Appearance: Normal appearance. She is not ill-appearing.   Abdominal:      General: Abdomen is flat. There is no distension.      Palpations: Abdomen is soft. There is no mass.      Tenderness: There is no abdominal tenderness. There is no rebound.      Hernia: No hernia is present.      Comments: The incision is clean, dry, intact and well-healed.  There are no signs of infection.   Musculoskeletal:         General: No swelling.      Right lower leg: No edema.      Left lower leg: No edema.   Skin:     General: Skin is warm and dry.      Findings: No rash.   Neurological:      Mental Status: She is alert and oriented to person, place, and time.   Psychiatric:         Mood and Affect: Mood normal.         Behavior: Behavior normal.         Thought Content: Thought content normal.         Judgment: Judgment normal.       Last PAP:   Last Completed Pap Smear            PAP SMEAR (Every 3 Years) Next due on 2023  IGP,CtNgTv,rfx Aptima HPV ASCU    2022  IGP, CtNg, Rfx Aptima HPV ASCU    2017  Done                    Assessment and Plan:  Diagnoses and all orders for this visit:    1. Encounter for postpartum visit (Primary)  Assessment & Plan:  Stable postoperative/postpartum course  May resume normal " activities  Recommend continuing daily multivitamin with folic acid and prenatal vitamin  Declines birth control.  Recommended condom use and to prevent pregnancy for a minimum of 18 months postdelivery      2.  delivery delivered        Counseling:  All birth control options reviewed in detail.  R/B/A/SE/E of each wrt pts PMHx and prior BC use  MAURICIO risk w hormonal BIRTH CONTROL reviewed (estrogen containing only), S/Sx to watch for discussed and questions answered.  Newer studies indicate possible increased breast cancer reviewed (both estrogen and progestin only).  Ok to resume intercourse  May resume intercourse once 6 weeks postpartum  Core strengthening exercises reviewed and recommended  Kegel exercises reviewed and recommended  Ok to return to work/school once patient desires/maternity leave completed    Follow Up:  Return for Annual physical.    Drew Jennings MD  2024

## 2024-07-09 NOTE — ASSESSMENT & PLAN NOTE
Stable postoperative/postpartum course  May resume normal activities  Recommend continuing daily multivitamin with folic acid and prenatal vitamin  Declines birth control.  Recommended condom use and to prevent pregnancy for a minimum of 18 months postdelivery

## 2024-07-11 ENCOUNTER — TELEPHONE (OUTPATIENT)
Dept: FAMILY MEDICINE CLINIC | Facility: CLINIC | Age: 29
End: 2024-07-11
Payer: COMMERCIAL

## 2024-07-11 NOTE — TELEPHONE ENCOUNTER
Caller: Mali Mercer    Relationship to patient: Self    Best call back number:     [phone]       Chief complaint: PAPERWORK FOR DIVING CLASS    Type of visit: PHYSICAL    Requested date: BEFORE JULY 27TH    Additional notes:NEEDING PAPERWORK BEFORE JULY 27TH.

## 2024-07-18 ENCOUNTER — OFFICE VISIT (OUTPATIENT)
Dept: FAMILY MEDICINE CLINIC | Facility: CLINIC | Age: 29
End: 2024-07-18
Payer: COMMERCIAL

## 2024-07-18 VITALS
DIASTOLIC BLOOD PRESSURE: 66 MMHG | OXYGEN SATURATION: 100 % | BODY MASS INDEX: 35.44 KG/M2 | HEART RATE: 96 BPM | WEIGHT: 200 LBS | TEMPERATURE: 96.2 F | SYSTOLIC BLOOD PRESSURE: 124 MMHG | RESPIRATION RATE: 16 BRPM | HEIGHT: 63 IN

## 2024-07-18 DIAGNOSIS — R21 RASH: ICD-10-CM

## 2024-07-18 DIAGNOSIS — Z00.00 ANNUAL PHYSICAL EXAM: Primary | ICD-10-CM

## 2024-07-18 DIAGNOSIS — F17.200 SMOKING: ICD-10-CM

## 2024-07-18 DIAGNOSIS — E66.9 OBESITY (BMI 30-39.9): ICD-10-CM

## 2024-07-18 PROCEDURE — 99395 PREV VISIT EST AGE 18-39: CPT | Performed by: STUDENT IN AN ORGANIZED HEALTH CARE EDUCATION/TRAINING PROGRAM

## 2024-07-18 RX ORDER — NICOTINE 21 MG/24HR
1 PATCH, TRANSDERMAL 24 HOURS TRANSDERMAL EVERY 24 HOURS
Qty: 28 EACH | Refills: 1 | Status: SHIPPED | OUTPATIENT
Start: 2024-07-18

## 2024-07-18 RX ORDER — CLOTRIMAZOLE AND BETAMETHASONE DIPROPIONATE 10; .64 MG/G; MG/G
1 CREAM TOPICAL 2 TIMES DAILY
Qty: 45 G | Refills: 0 | Status: SHIPPED | OUTPATIENT
Start: 2024-07-18

## 2024-07-18 NOTE — PROGRESS NOTES
"Chief Complaint  Annual physical    Subjective         Mali Mercer is a 28 y.o. female who presents to [facility]    28 years old comes to the clinic today for annual physical.    Patient would like clearance form for scuba diving.     about 6 weeks ago, doing well.  No complication after surgery.    Patient does report some irritation and possible fungal rash around groin area and would like to get topical cream.    Current smoker, would like to quit and would like to try patches.    12+ review of systems are unremarkable otherwise    Objective   Vital Signs:   Vitals:    24 0829   BP: 124/66   Pulse: 96   Resp: 16   Temp: 96.2 °F (35.7 °C)   SpO2: 100%   Weight: 90.7 kg (200 lb)   Height: 160 cm (63\")      Body mass index is 35.43 kg/m².   Wt Readings from Last 3 Encounters:   24 90.7 kg (200 lb)   24 89.8 kg (198 lb)   24 94.8 kg (209 lb)      BP Readings from Last 3 Encounters:   24 124/66   24 126/84   24 120/83        Patient Care Team:  Kellen Casanova MD as PCP - General (Family Medicine)     Physical Exam  Vitals reviewed.   Constitutional:       Appearance: Normal appearance. She is well-developed.   HENT:      Head: Normocephalic and atraumatic.      Right Ear: External ear normal.      Left Ear: External ear normal.      Mouth/Throat:      Pharynx: No oropharyngeal exudate.   Eyes:      Conjunctiva/sclera: Conjunctivae normal.      Pupils: Pupils are equal, round, and reactive to light.   Cardiovascular:      Rate and Rhythm: Normal rate and regular rhythm.      Heart sounds: No murmur heard.     No friction rub. No gallop.   Pulmonary:      Effort: Pulmonary effort is normal.      Breath sounds: Normal breath sounds. No wheezing or rhonchi.   Abdominal:      General: Bowel sounds are normal. There is no distension.      Palpations: Abdomen is soft.      Tenderness: There is no abdominal tenderness.   Skin:     " General: Skin is warm and dry.   Neurological:      Mental Status: She is alert and oriented to person, place, and time.      Cranial Nerves: No cranial nerve deficit.   Psychiatric:         Mood and Affect: Mood and affect normal.         Behavior: Behavior normal.         Thought Content: Thought content normal.         Judgment: Judgment normal.            Class 2 Severe Obesity (BMI >=35 and <=39.9). Obesity-related health conditions include the following: obstructive sleep apnea, hypertension, and coronary heart disease. Obesity is unchanged. BMI is is above average; BMI management plan is completed. We discussed portion control and increasing exercise.              Assessment and Plan   Diagnoses and all orders for this visit:    1. Annual physical exam (Primary)  Comments:  Blood work ordered, healthy lifestyle discussed  Orders:  -     TSH Rfx On Abnormal To Free T4; Future  -     CBC & Differential; Future  -     Comprehensive Metabolic Panel; Future  -     Hemoglobin A1c; Future  -     Lipid Panel; Future  -     Urinalysis With Microscopic - Urine, Clean Catch; Future    2. Obesity (BMI 30-39.9)  Comments:  Daily exercise and healthy diet recommended  Orders:  -     TSH Rfx On Abnormal To Free T4; Future  -     CBC & Differential; Future  -     Comprehensive Metabolic Panel; Future  -     Hemoglobin A1c; Future  -     Lipid Panel; Future  -     Urinalysis With Microscopic - Urine, Clean Catch; Future    3. Smoking  Comments:  Smoking cessation discussed, NicoDerm patches prescribed  Orders:  -     nicotine (Nicoderm CQ) 21 MG/24HR patch; Place 1 patch on the skin as directed by provider Daily.  Dispense: 28 each; Refill: 1    4. Rash  Comments:  Lotrisone cream prescribed.  Patient will need physical exam/further evaluation if not improved  Orders:  -     clotrimazole-betamethasone (LOTRISONE) 1-0.05 % cream; Apply 1 Application topically to the appropriate area as directed 2 (Two) Times a Day.  Dispense:  45 g; Refill: 0          Tobacco Use: High Risk (7/18/2024)    Patient History     Smoking Tobacco Use: Every Day     Smokeless Tobacco Use: Never     Passive Exposure: Yes            Follow Up   Return in about 6 months (around 1/18/2025).  Patient was given instructions and counseling regarding her condition or for health maintenance advice. Please see specific information pulled into the AVS if appropriate.

## 2025-01-20 ENCOUNTER — TELEPHONE (OUTPATIENT)
Dept: FAMILY MEDICINE CLINIC | Facility: CLINIC | Age: 30
End: 2025-01-20
Payer: COMMERCIAL

## 2025-01-20 ENCOUNTER — LAB (OUTPATIENT)
Dept: LAB | Facility: HOSPITAL | Age: 30
End: 2025-01-20
Payer: COMMERCIAL

## 2025-01-20 DIAGNOSIS — E66.9 OBESITY (BMI 30-39.9): ICD-10-CM

## 2025-01-20 DIAGNOSIS — Z00.00 ANNUAL PHYSICAL EXAM: ICD-10-CM

## 2025-01-20 LAB
BACTERIA UR QL AUTO: NORMAL /HPF
BASOPHILS # BLD AUTO: 0.03 10*3/MM3 (ref 0–0.2)
BASOPHILS NFR BLD AUTO: 0.5 % (ref 0–1.5)
BILIRUB UR QL STRIP: NEGATIVE
CLARITY UR: CLEAR
COLOR UR: YELLOW
DEPRECATED RDW RBC AUTO: 39.8 FL (ref 37–54)
EOSINOPHIL # BLD AUTO: 0.1 10*3/MM3 (ref 0–0.4)
EOSINOPHIL NFR BLD AUTO: 1.8 % (ref 0.3–6.2)
ERYTHROCYTE [DISTWIDTH] IN BLOOD BY AUTOMATED COUNT: 13.5 % (ref 12.3–15.4)
GLUCOSE UR STRIP-MCNC: NEGATIVE MG/DL
HBA1C MFR BLD: 5.7 % (ref 4.8–5.6)
HCT VFR BLD AUTO: 40.4 % (ref 34–46.6)
HGB BLD-MCNC: 13.3 G/DL (ref 12–15.9)
HGB UR QL STRIP.AUTO: NEGATIVE
HYALINE CASTS UR QL AUTO: NORMAL /LPF
IMM GRANULOCYTES # BLD AUTO: 0.01 10*3/MM3 (ref 0–0.05)
IMM GRANULOCYTES NFR BLD AUTO: 0.2 % (ref 0–0.5)
KETONES UR QL STRIP: NEGATIVE
LEUKOCYTE ESTERASE UR QL STRIP.AUTO: NEGATIVE
LYMPHOCYTES # BLD AUTO: 1.54 10*3/MM3 (ref 0.7–3.1)
LYMPHOCYTES NFR BLD AUTO: 27.3 % (ref 19.6–45.3)
MCH RBC QN AUTO: 26.7 PG (ref 26.6–33)
MCHC RBC AUTO-ENTMCNC: 32.9 G/DL (ref 31.5–35.7)
MCV RBC AUTO: 81 FL (ref 79–97)
MONOCYTES # BLD AUTO: 0.46 10*3/MM3 (ref 0.1–0.9)
MONOCYTES NFR BLD AUTO: 8.1 % (ref 5–12)
NEUTROPHILS NFR BLD AUTO: 3.51 10*3/MM3 (ref 1.7–7)
NEUTROPHILS NFR BLD AUTO: 62.1 % (ref 42.7–76)
NITRITE UR QL STRIP: NEGATIVE
NRBC BLD AUTO-RTO: 0 /100 WBC (ref 0–0.2)
PH UR STRIP.AUTO: 6.5 [PH] (ref 5–8)
PLATELET # BLD AUTO: 317 10*3/MM3 (ref 140–450)
PMV BLD AUTO: 9.9 FL (ref 6–12)
PROT UR QL STRIP: NEGATIVE
RBC # BLD AUTO: 4.99 10*6/MM3 (ref 3.77–5.28)
RBC # UR STRIP: NORMAL /HPF
REF LAB TEST METHOD: NORMAL
SP GR UR STRIP: 1.02 (ref 1–1.03)
SQUAMOUS #/AREA URNS HPF: NORMAL /HPF
UROBILINOGEN UR QL STRIP: NORMAL
WBC # UR STRIP: NORMAL /HPF
WBC NRBC COR # BLD AUTO: 5.65 10*3/MM3 (ref 3.4–10.8)

## 2025-01-20 PROCEDURE — 84443 ASSAY THYROID STIM HORMONE: CPT

## 2025-01-20 PROCEDURE — 36415 COLL VENOUS BLD VENIPUNCTURE: CPT

## 2025-01-20 PROCEDURE — 81001 URINALYSIS AUTO W/SCOPE: CPT

## 2025-01-20 PROCEDURE — 83036 HEMOGLOBIN GLYCOSYLATED A1C: CPT

## 2025-01-20 PROCEDURE — 80061 LIPID PANEL: CPT

## 2025-01-20 PROCEDURE — 85025 COMPLETE CBC W/AUTO DIFF WBC: CPT

## 2025-01-20 PROCEDURE — 80053 COMPREHEN METABOLIC PANEL: CPT

## 2025-01-21 DIAGNOSIS — N28.9 ABNORMAL KIDNEY FUNCTION: Primary | ICD-10-CM

## 2025-01-21 LAB
ALBUMIN SERPL-MCNC: 3.6 G/DL (ref 3.5–5.2)
ALBUMIN/GLOB SERPL: 0.9 G/DL
ALP SERPL-CCNC: 67 U/L (ref 39–117)
ALT SERPL W P-5'-P-CCNC: 9 U/L (ref 1–33)
ANION GAP SERPL CALCULATED.3IONS-SCNC: 11.5 MMOL/L (ref 5–15)
AST SERPL-CCNC: 15 U/L (ref 1–32)
BILIRUB SERPL-MCNC: 0.3 MG/DL (ref 0–1.2)
BUN SERPL-MCNC: 12 MG/DL (ref 6–20)
BUN/CREAT SERPL: 11 (ref 7–25)
CALCIUM SPEC-SCNC: 9.4 MG/DL (ref 8.6–10.5)
CHLORIDE SERPL-SCNC: 100 MMOL/L (ref 98–107)
CHOLEST SERPL-MCNC: 157 MG/DL (ref 0–200)
CO2 SERPL-SCNC: 25.5 MMOL/L (ref 22–29)
CREAT SERPL-MCNC: 1.09 MG/DL (ref 0.57–1)
EGFRCR SERPLBLD CKD-EPI 2021: 70.2 ML/MIN/1.73
GLOBULIN UR ELPH-MCNC: 4 GM/DL
GLUCOSE SERPL-MCNC: 85 MG/DL (ref 65–99)
HDLC SERPL-MCNC: 54 MG/DL (ref 40–60)
LDLC SERPL CALC-MCNC: 85 MG/DL (ref 0–100)
LDLC/HDLC SERPL: 1.54 {RATIO}
POTASSIUM SERPL-SCNC: 4.5 MMOL/L (ref 3.5–5.2)
PROT SERPL-MCNC: 7.6 G/DL (ref 6–8.5)
SODIUM SERPL-SCNC: 137 MMOL/L (ref 136–145)
TRIGL SERPL-MCNC: 99 MG/DL (ref 0–150)
TSH SERPL DL<=0.05 MIU/L-ACNC: 2.27 UIU/ML (ref 0.27–4.2)
VLDLC SERPL-MCNC: 18 MG/DL (ref 5–40)

## 2025-02-25 ENCOUNTER — OFFICE VISIT (OUTPATIENT)
Dept: FAMILY MEDICINE CLINIC | Facility: CLINIC | Age: 30
End: 2025-02-25
Payer: COMMERCIAL

## 2025-02-25 VITALS
WEIGHT: 228 LBS | HEART RATE: 106 BPM | TEMPERATURE: 97.7 F | HEIGHT: 63 IN | OXYGEN SATURATION: 94 % | BODY MASS INDEX: 40.4 KG/M2 | RESPIRATION RATE: 16 BRPM | SYSTOLIC BLOOD PRESSURE: 122 MMHG | DIASTOLIC BLOOD PRESSURE: 64 MMHG

## 2025-02-25 DIAGNOSIS — Z79.899 MEDICATION MANAGEMENT: ICD-10-CM

## 2025-02-25 DIAGNOSIS — E66.01 MORBID OBESITY WITH BMI OF 40.0-44.9, ADULT: Primary | ICD-10-CM

## 2025-02-25 DIAGNOSIS — M79.89 SWELLING OF EXTREMITY: ICD-10-CM

## 2025-02-25 LAB
AMPHET+METHAMPHET UR QL: NEGATIVE
AMPHETAMINE INTERNAL CONTROL: NORMAL
AMPHETAMINES UR QL: NEGATIVE
BARBITURATE INTERNAL CONTROL: NORMAL
BARBITURATES UR QL SCN: NEGATIVE
BENZODIAZ UR QL SCN: NEGATIVE
BENZODIAZEPINE INTERNAL CONTROL: NORMAL
BUPRENORPHINE INTERNAL CONTROL: NORMAL
BUPRENORPHINE SERPL-MCNC: NEGATIVE NG/ML
CANNABINOIDS SERPL QL: NEGATIVE
COCAINE INTERNAL CONTROL: NORMAL
COCAINE UR QL: NEGATIVE
EXPIRATION DATE: NORMAL
Lab: NORMAL
MDMA (ECSTASY) INTERNAL CONTROL: NORMAL
MDMA UR QL SCN: NEGATIVE
METHADONE INTERNAL CONTROL: NORMAL
METHADONE UR QL SCN: NEGATIVE
METHAMPHETAMINE INTERNAL CONTROL: NORMAL
MORPHINE INTERNAL CONTROL: NORMAL
MORPHINE/OPIATES SCREEN, URINE: NEGATIVE
OXYCODONE INTERNAL CONTROL: NORMAL
OXYCODONE UR QL SCN: NEGATIVE
PCP UR QL SCN: NEGATIVE
PHENCYCLIDINE INTERNAL CONTROL: NORMAL
THC INTERNAL CONTROL: NORMAL

## 2025-02-25 PROCEDURE — 80305 DRUG TEST PRSMV DIR OPT OBS: CPT | Performed by: STUDENT IN AN ORGANIZED HEALTH CARE EDUCATION/TRAINING PROGRAM

## 2025-02-25 PROCEDURE — 99214 OFFICE O/P EST MOD 30 MIN: CPT | Performed by: STUDENT IN AN ORGANIZED HEALTH CARE EDUCATION/TRAINING PROGRAM

## 2025-02-25 RX ORDER — PHENTERMINE HYDROCHLORIDE 30 MG/1
30 CAPSULE ORAL EVERY MORNING
Qty: 30 CAPSULE | Refills: 1 | Status: SHIPPED | OUTPATIENT
Start: 2025-02-25

## 2025-02-25 RX ORDER — FUROSEMIDE 20 MG/1
20 TABLET ORAL DAILY PRN
Qty: 90 TABLET | Refills: 1 | Status: SHIPPED | OUTPATIENT
Start: 2025-02-25

## 2025-02-25 NOTE — PROGRESS NOTES
"Chief Complaint  Annual Exam and Obesity (Discuss weight loss options)    Subjective         Mali Mercer is a 29 y.o. female who presents to [facility]      29 years old comes to the clinic today for an acute visit.    8 months postpartum.  Struggling with weight, has been trying hard but unable to lose weight.  Has been gaining lots of weight in last 4 months.    Not breast-feeding currently, baby is doing well.    Patient would like to start phentermine, has tried it in the past.    12+ review of systems are unremarkable otherwise, blood work reviewed  Objective   Vital Signs:   Vitals:    02/25/25 1442   BP: 122/64   BP Location: Left arm   Patient Position: Sitting   Cuff Size: Adult   Pulse: 106   Resp: 16   Temp: 97.7 °F (36.5 °C)   TempSrc: Temporal   SpO2: 94%   Weight: 103 kg (228 lb)   Height: 160 cm (63\")   PainSc: 0-No pain      Body mass index is 40.39 kg/m².   Wt Readings from Last 3 Encounters:   02/25/25 103 kg (228 lb)   07/18/24 90.7 kg (200 lb)   07/09/24 89.8 kg (198 lb)      BP Readings from Last 3 Encounters:   02/25/25 122/64   07/18/24 124/66   07/09/24 126/84        Patient Care Team:  Kellen Casanova MD as PCP - General (Family Medicine)     Physical Exam  Vitals reviewed.   Constitutional:       Appearance: Normal appearance. She is well-developed.   HENT:      Head: Normocephalic and atraumatic.      Right Ear: External ear normal.      Left Ear: External ear normal.      Mouth/Throat:      Pharynx: No oropharyngeal exudate.   Eyes:      Conjunctiva/sclera: Conjunctivae normal.      Pupils: Pupils are equal, round, and reactive to light.   Cardiovascular:      Rate and Rhythm: Normal rate and regular rhythm.      Heart sounds: No murmur heard.     No friction rub. No gallop.   Pulmonary:      Effort: Pulmonary effort is normal.      Breath sounds: Normal breath sounds. No wheezing or rhonchi.   Abdominal:      General: Bowel sounds are normal. " There is no distension.      Palpations: Abdomen is soft.      Tenderness: There is no abdominal tenderness.   Skin:     General: Skin is warm and dry.   Neurological:      Mental Status: She is alert and oriented to person, place, and time.      Cranial Nerves: No cranial nerve deficit.   Psychiatric:         Mood and Affect: Mood and affect normal.         Behavior: Behavior normal.         Thought Content: Thought content normal.         Judgment: Judgment normal.                          Assessment and Plan   Diagnoses and all orders for this visit:    1. Morbid obesity with BMI of 40.0-44.9, adult (Primary)  Comments:  We will start patient on phentermine, UDS/Stevie/contract reviewed 4 months prescription  Orders:  -     POC Medline 12 Panel Urine Drug Screen  -     phentermine 30 MG capsule; Take 1 capsule by mouth Every Morning.  Dispense: 30 capsule; Refill: 1    2. Medication management  -     POC Medline 12 Panel Urine Drug Screen  -     phentermine 30 MG capsule; Take 1 capsule by mouth Every Morning.  Dispense: 30 capsule; Refill: 1    3. Swelling of extremity  -     furosemide (Lasix) 20 MG tablet; Take 1 tablet by mouth Daily As Needed (swelling).  Dispense: 90 tablet; Refill: 1          Tobacco Use: High Risk (2/25/2025)    Patient History    • Smoking Tobacco Use: Every Day    • Smokeless Tobacco Use: Never    • Passive Exposure: Yes            Follow Up   No follow-ups on file.  Patient was given instructions and counseling regarding her condition or for health maintenance advice. Please see specific information pulled into the AVS if appropriate.

## 2025-05-22 ENCOUNTER — TELEPHONE (OUTPATIENT)
Dept: OBSTETRICS AND GYNECOLOGY | Age: 30
End: 2025-05-22
Payer: COMMERCIAL

## 2025-05-22 NOTE — TELEPHONE ENCOUNTER
Caller: Mali Mercer    Relationship: Self    Best call back number: [phone]      What orders are you requesting (i.e. lab or imaging): HCG ORDER     In what timeframe would the patient need to come in: ASAP     Where will you receive your lab/imaging services: [facility] IN [place]    Additional notes: PT IS 2 WEEKS LATE STARTING HER CYCLE AND WOULD LIKE AN HCG LAB ORDER PLACED SO SHE CAN HAVE A BLOOD DRAW PLEASE CALL

## 2025-06-03 ENCOUNTER — OFFICE VISIT (OUTPATIENT)
Dept: OBSTETRICS AND GYNECOLOGY | Age: 30
End: 2025-06-03
Payer: COMMERCIAL

## 2025-06-03 VITALS
HEART RATE: 84 BPM | DIASTOLIC BLOOD PRESSURE: 72 MMHG | SYSTOLIC BLOOD PRESSURE: 138 MMHG | HEIGHT: 63 IN | BODY MASS INDEX: 39.34 KG/M2 | WEIGHT: 222 LBS

## 2025-06-03 DIAGNOSIS — N92.6 LATE MENSES: Primary | ICD-10-CM

## 2025-06-03 NOTE — PROGRESS NOTES
"GYN Problem/Follow Up Visit    Chief Complaint   Patient presents with    Amenorrhea           HPI  Mali Mercer is a 29 y.o. female, , who presents for late menses last month. States typically regular monthly menses since having baby one year ago. Last month she was 2.5 weeks late and when it finally started it seemed more painful for a few days of her menses. The bleeding lasted a couple more days than usual. No pain or bleeding at present. Took a pregnancy test at home when she was late and it was negative. Not on any bc. Does not breastfeed.        Additional OB/GYN History   Patient's last menstrual period was 2025.  Current contraception: contraceptive methods: None  Desires to: do not start contraception  Allergies : Iodine and Sulfa antibiotics     The additional following portions of the patient's history were reviewed and updated as appropriate: allergies, current medications, past family history, past medical history, past social history, past surgical history, and problem list.    Review of Systems    I have reviewed and agree with the HPI, ROS, and historical information as entered above. Lucila Jimenes, APRN    Objective   /72   Pulse 84   Ht 160 cm (62.99\")   Wt 101 kg (222 lb)   LMP 2025   Breastfeeding No   BMI 39.34 kg/m²     Physical Exam  Vitals reviewed.   Neurological:      Mental Status: She is alert and oriented to person, place, and time.            Assessment and Plan    Diagnoses and all orders for this visit:    1. Late menses (Primary)    Urine preg test negative today. Discussed monitoring her cycles and f/u for any concerns. Declines bc today. Consider pelvic u/s and hormone panel if sx persist.    Counseling:  She understands the importance of having the above orders performed in a timely fashion.  She is encouraged to review her results online and/or contact or office if she has questions.     Follow Up:  Return if symptoms worsen or fail to " improve.      Lucila Jimenes, APRN  06/03/2025

## (undated) DEVICE — VIOLET BRAIDED (POLYGLACTIN 910), SYNTHETIC ABSORBABLE SUTURE: Brand: COATED VICRYL

## (undated) DEVICE — SUT VIC 3/0 CTI 36IN J944H

## (undated) DEVICE — SUT MNCRYL 4/0 PS2 18 IN

## (undated) DEVICE — STERILE POLYISOPRENE POWDER-FREE SURGICAL GLOVES WITH EMOLLIENT COATING: Brand: PROTEXIS

## (undated) DEVICE — DEV TRANSF BLD W/LUER ADPT CA/198

## (undated) DEVICE — INTENDED FOR TISSUE SEPARATION, AND OTHER PROCEDURES THAT REQUIRE A SHARP SURGICAL BLADE TO PUNCTURE OR CUT.: Brand: BARD-PARKER ® CARBON RIB-BACK BLADES

## (undated) DEVICE — Device: Brand: PORTEX

## (undated) DEVICE — SUT CHRM 0 CT1 36IN 924H

## (undated) DEVICE — GLV SURG BIOGEL LTX PF 7

## (undated) DEVICE — SUT MNCRYL 0/0 CTX 36IN Y398H

## (undated) DEVICE — NEEDLE,18GX1.5",REG,BEVEL: Brand: MEDLINE

## (undated) DEVICE — SUT MNCRYL 0 CT1 27IN VIL

## (undated) DEVICE — PAD GRND REM POLYHESIVE A/ DISP

## (undated) DEVICE — CVR HNDL LT SURG ACCSSRY BLU STRL

## (undated) DEVICE — TRY CATH FOL ADVANCE SIL W/BAG 16F

## (undated) DEVICE — C SECTION PACK: Brand: MEDLINE INDUSTRIES, INC.